# Patient Record
Sex: FEMALE | Race: BLACK OR AFRICAN AMERICAN | Employment: OTHER | URBAN - METROPOLITAN AREA
[De-identification: names, ages, dates, MRNs, and addresses within clinical notes are randomized per-mention and may not be internally consistent; named-entity substitution may affect disease eponyms.]

---

## 2017-02-13 ENCOUNTER — HOSPITAL ENCOUNTER (OUTPATIENT)
Dept: WOMENS IMAGING | Age: 67
Discharge: HOME | End: 2017-02-13
Attending: FAMILY MEDICINE | Admitting: FAMILY MEDICINE

## 2017-02-13 DIAGNOSIS — Z12.31 VISIT FOR SCREENING MAMMOGRAM: ICD-10-CM

## 2018-02-24 ENCOUNTER — HOSPITAL ENCOUNTER (OUTPATIENT)
Dept: WOMENS IMAGING | Age: 68
Discharge: HOME | End: 2018-02-24
Attending: FAMILY MEDICINE | Admitting: FAMILY MEDICINE

## 2018-02-24 DIAGNOSIS — Z12.31 VISIT FOR SCREENING MAMMOGRAM: ICD-10-CM

## 2018-04-09 ENCOUNTER — TELEPHONE (OUTPATIENT)
Dept: BARIATRICS/WEIGHT MGMT | Age: 68
End: 2018-04-09

## 2018-05-02 ENCOUNTER — OFFICE VISIT (OUTPATIENT)
Dept: BARIATRICS/WEIGHT MGMT | Age: 68
End: 2018-05-02

## 2018-05-02 VITALS
SYSTOLIC BLOOD PRESSURE: 128 MMHG | HEIGHT: 63 IN | HEART RATE: 76 BPM | BODY MASS INDEX: 40.96 KG/M2 | WEIGHT: 231.2 LBS | DIASTOLIC BLOOD PRESSURE: 81 MMHG

## 2018-05-02 DIAGNOSIS — E66.01 MORBID OBESITY WITH BMI OF 40.0-44.9, ADULT (HCC): Primary | ICD-10-CM

## 2018-05-02 DIAGNOSIS — E78.5 HYPERLIPIDEMIA, UNSPECIFIED HYPERLIPIDEMIA TYPE: ICD-10-CM

## 2018-05-02 DIAGNOSIS — K21.9 CHRONIC GERD: ICD-10-CM

## 2018-05-02 DIAGNOSIS — E11.9 CONTROLLED TYPE 2 DIABETES MELLITUS WITHOUT COMPLICATION, WITHOUT LONG-TERM CURRENT USE OF INSULIN (HCC): ICD-10-CM

## 2018-05-02 PROCEDURE — 3046F HEMOGLOBIN A1C LEVEL >9.0%: CPT | Performed by: SURGERY

## 2018-05-02 PROCEDURE — G8400 PT W/DXA NO RESULTS DOC: HCPCS | Performed by: SURGERY

## 2018-05-02 PROCEDURE — 3017F COLORECTAL CA SCREEN DOC REV: CPT | Performed by: SURGERY

## 2018-05-02 PROCEDURE — 4040F PNEUMOC VAC/ADMIN/RCVD: CPT | Performed by: SURGERY

## 2018-05-02 PROCEDURE — 2022F DILAT RTA XM EVC RTNOPTHY: CPT | Performed by: SURGERY

## 2018-05-02 PROCEDURE — G8417 CALC BMI ABV UP PARAM F/U: HCPCS | Performed by: SURGERY

## 2018-05-02 PROCEDURE — G8427 DOCREV CUR MEDS BY ELIG CLIN: HCPCS | Performed by: SURGERY

## 2018-05-02 PROCEDURE — 1036F TOBACCO NON-USER: CPT | Performed by: SURGERY

## 2018-05-02 PROCEDURE — 1123F ACP DISCUSS/DSCN MKR DOCD: CPT | Performed by: SURGERY

## 2018-05-02 PROCEDURE — 99204 OFFICE O/P NEW MOD 45 MIN: CPT | Performed by: SURGERY

## 2018-05-02 PROCEDURE — 1090F PRES/ABSN URINE INCON ASSESS: CPT | Performed by: SURGERY

## 2018-05-02 RX ORDER — PANTOPRAZOLE SODIUM 40 MG/1
TABLET, DELAYED RELEASE ORAL NIGHTLY
COMMUNITY
Start: 2018-01-22

## 2018-05-02 RX ORDER — FERROUS SULFATE 325(65) MG
325 TABLET ORAL 2 TIMES DAILY
COMMUNITY

## 2018-06-06 ENCOUNTER — OFFICE VISIT (OUTPATIENT)
Dept: BARIATRICS/WEIGHT MGMT | Age: 68
End: 2018-06-06

## 2018-06-06 VITALS
WEIGHT: 233.6 LBS | HEIGHT: 63 IN | HEART RATE: 85 BPM | BODY MASS INDEX: 41.39 KG/M2 | SYSTOLIC BLOOD PRESSURE: 151 MMHG | DIASTOLIC BLOOD PRESSURE: 92 MMHG

## 2018-06-06 DIAGNOSIS — E11.9 CONTROLLED TYPE 2 DIABETES MELLITUS WITHOUT COMPLICATION, WITHOUT LONG-TERM CURRENT USE OF INSULIN (HCC): ICD-10-CM

## 2018-06-06 DIAGNOSIS — K21.9 CHRONIC GERD: ICD-10-CM

## 2018-06-06 DIAGNOSIS — E78.5 HYPERLIPIDEMIA, UNSPECIFIED HYPERLIPIDEMIA TYPE: ICD-10-CM

## 2018-06-06 DIAGNOSIS — E66.01 MORBID OBESITY WITH BMI OF 40.0-44.9, ADULT (HCC): Primary | ICD-10-CM

## 2018-06-06 PROCEDURE — G8417 CALC BMI ABV UP PARAM F/U: HCPCS | Performed by: SURGERY

## 2018-06-06 PROCEDURE — 1090F PRES/ABSN URINE INCON ASSESS: CPT | Performed by: SURGERY

## 2018-06-06 PROCEDURE — G8427 DOCREV CUR MEDS BY ELIG CLIN: HCPCS | Performed by: SURGERY

## 2018-06-06 PROCEDURE — 2022F DILAT RTA XM EVC RTNOPTHY: CPT | Performed by: SURGERY

## 2018-06-06 PROCEDURE — 3046F HEMOGLOBIN A1C LEVEL >9.0%: CPT | Performed by: SURGERY

## 2018-06-06 PROCEDURE — 1123F ACP DISCUSS/DSCN MKR DOCD: CPT | Performed by: SURGERY

## 2018-06-06 PROCEDURE — 1036F TOBACCO NON-USER: CPT | Performed by: SURGERY

## 2018-06-06 PROCEDURE — 4040F PNEUMOC VAC/ADMIN/RCVD: CPT | Performed by: SURGERY

## 2018-06-06 PROCEDURE — 99213 OFFICE O/P EST LOW 20 MIN: CPT | Performed by: SURGERY

## 2018-06-06 PROCEDURE — 3017F COLORECTAL CA SCREEN DOC REV: CPT | Performed by: SURGERY

## 2018-06-06 PROCEDURE — G8400 PT W/DXA NO RESULTS DOC: HCPCS | Performed by: SURGERY

## 2018-06-09 ENCOUNTER — HOSPITAL ENCOUNTER (OUTPATIENT)
Dept: OTHER | Age: 68
Discharge: HOME | End: 2018-06-09
Attending: SURGERY | Admitting: SURGERY

## 2018-06-09 DIAGNOSIS — E66.01 MORBID OBESITY WITH BMI OF 40.0-44.9, ADULT (HCC): ICD-10-CM

## 2018-06-09 DIAGNOSIS — E78.5 HYPERLIPIDEMIA, UNSPECIFIED HYPERLIPIDEMIA TYPE: ICD-10-CM

## 2018-06-09 LAB
A/G RATIO: 1.2 (ref 1.1–2.2)
ALBUMIN SERPL-MCNC: 3.9 G/DL (ref 3.4–5)
ALP BLD-CCNC: 64 U/L (ref 40–129)
ALT SERPL-CCNC: 13 U/L (ref 10–40)
ANION GAP SERPL CALCULATED.3IONS-SCNC: 15 MMOL/L (ref 3–16)
AST SERPL-CCNC: 18 U/L (ref 15–37)
BASOPHILS ABSOLUTE: 0 K/UL (ref 0–0.2)
BASOPHILS RELATIVE PERCENT: 0.9 %
BILIRUB SERPL-MCNC: 0.6 MG/DL (ref 0–1)
BUN BLDV-MCNC: 10 MG/DL (ref 7–20)
CALCIUM SERPL-MCNC: 9.3 MG/DL (ref 8.3–10.6)
CHLORIDE BLD-SCNC: 100 MMOL/L (ref 99–110)
CHOLESTEROL, TOTAL: 146 MG/DL (ref 0–199)
CO2: 26 MMOL/L (ref 21–32)
CREAT SERPL-MCNC: 0.7 MG/DL (ref 0.6–1.2)
EOSINOPHILS ABSOLUTE: 0.3 K/UL (ref 0–0.6)
EOSINOPHILS RELATIVE PERCENT: 5.7 %
FOLATE: 15.59 NG/ML (ref 4.78–24.2)
GFR AFRICAN AMERICAN: >60
GFR NON-AFRICAN AMERICAN: >60
GLOBULIN: 3.2 G/DL
GLUCOSE BLD-MCNC: 95 MG/DL (ref 70–99)
HCT VFR BLD CALC: 44.4 % (ref 36–48)
HDLC SERPL-MCNC: 59 MG/DL (ref 40–60)
HEMOGLOBIN: 15 G/DL (ref 12–16)
IRON SATURATION: 25 % (ref 15–50)
IRON: 85 UG/DL (ref 37–145)
LDL CHOLESTEROL CALCULATED: 72 MG/DL
LYMPHOCYTES ABSOLUTE: 1.3 K/UL (ref 1–5.1)
LYMPHOCYTES RELATIVE PERCENT: 29.2 %
MCH RBC QN AUTO: 31.8 PG (ref 26–34)
MCHC RBC AUTO-ENTMCNC: 33.7 G/DL (ref 31–36)
MCV RBC AUTO: 94.3 FL (ref 80–100)
MONOCYTES ABSOLUTE: 0.4 K/UL (ref 0–1.3)
MONOCYTES RELATIVE PERCENT: 8 %
NEUTROPHILS ABSOLUTE: 2.6 K/UL (ref 1.7–7.7)
NEUTROPHILS RELATIVE PERCENT: 56.2 %
PDW BLD-RTO: 13.7 % (ref 12.4–15.4)
PLATELET # BLD: 269 K/UL (ref 135–450)
PMV BLD AUTO: 9.9 FL (ref 5–10.5)
POTASSIUM SERPL-SCNC: 3.7 MMOL/L (ref 3.5–5.1)
RBC # BLD: 4.71 M/UL (ref 4–5.2)
SODIUM BLD-SCNC: 141 MMOL/L (ref 136–145)
TOTAL IRON BINDING CAPACITY: 338 UG/DL (ref 260–445)
TOTAL PROTEIN: 7.1 G/DL (ref 6.4–8.2)
TRIGL SERPL-MCNC: 76 MG/DL (ref 0–150)
TSH REFLEX: 0.58 UIU/ML (ref 0.27–4.2)
VITAMIN B-12: 1284 PG/ML (ref 211–911)
VITAMIN D 25-HYDROXY: 22 NG/ML
VLDLC SERPL CALC-MCNC: 15 MG/DL
WBC # BLD: 4.6 K/UL (ref 4–11)

## 2018-06-10 LAB
ESTIMATED AVERAGE GLUCOSE: 111.2 MG/DL
HBA1C MFR BLD: 5.5 %

## 2018-06-11 ENCOUNTER — TELEPHONE (OUTPATIENT)
Dept: BARIATRICS/WEIGHT MGMT | Age: 68
End: 2018-06-11

## 2018-06-11 DIAGNOSIS — R06.02 SHORTNESS OF BREATH: ICD-10-CM

## 2018-06-11 DIAGNOSIS — Z01.818 PRE-OPERATIVE CLEARANCE: Primary | ICD-10-CM

## 2018-06-11 DIAGNOSIS — K21.9 CHRONIC GERD: ICD-10-CM

## 2018-07-02 ENCOUNTER — TELEPHONE (OUTPATIENT)
Dept: BARIATRICS/WEIGHT MGMT | Age: 68
End: 2018-07-02

## 2018-07-02 DIAGNOSIS — E66.01 MORBID OBESITY WITH BMI OF 40.0-44.9, ADULT (HCC): Primary | ICD-10-CM

## 2018-07-02 NOTE — TELEPHONE ENCOUNTER
PFT has been ordered, attempted to call patient but her mobile number does not have a vm to be able to leave a message. Will try patient again in the afternoon.

## 2018-07-02 NOTE — TELEPHONE ENCOUNTER
Please place it    For future reference, You can always place labs,imaging orders for me   No need to delay for my response     Just send me a note saying you did it    Thanks    [address] [facility]

## 2018-07-18 ENCOUNTER — OFFICE VISIT (OUTPATIENT)
Dept: BARIATRICS/WEIGHT MGMT | Age: 68
End: 2018-07-18

## 2018-07-18 VITALS
HEIGHT: 63 IN | WEIGHT: 229.8 LBS | SYSTOLIC BLOOD PRESSURE: 122 MMHG | BODY MASS INDEX: 40.72 KG/M2 | DIASTOLIC BLOOD PRESSURE: 89 MMHG | HEART RATE: 86 BPM

## 2018-07-18 DIAGNOSIS — E11.9 CONTROLLED TYPE 2 DIABETES MELLITUS WITHOUT COMPLICATION, WITHOUT LONG-TERM CURRENT USE OF INSULIN (HCC): ICD-10-CM

## 2018-07-18 DIAGNOSIS — E66.01 MORBID OBESITY WITH BMI OF 40.0-44.9, ADULT (HCC): Primary | ICD-10-CM

## 2018-07-18 DIAGNOSIS — K21.9 CHRONIC GERD: ICD-10-CM

## 2018-07-18 DIAGNOSIS — E78.5 HYPERLIPIDEMIA, UNSPECIFIED HYPERLIPIDEMIA TYPE: ICD-10-CM

## 2018-07-18 PROCEDURE — G8417 CALC BMI ABV UP PARAM F/U: HCPCS | Performed by: SURGERY

## 2018-07-18 PROCEDURE — 1036F TOBACCO NON-USER: CPT | Performed by: SURGERY

## 2018-07-18 PROCEDURE — G8427 DOCREV CUR MEDS BY ELIG CLIN: HCPCS | Performed by: SURGERY

## 2018-07-18 PROCEDURE — 3044F HG A1C LEVEL LT 7.0%: CPT | Performed by: SURGERY

## 2018-07-18 PROCEDURE — 3017F COLORECTAL CA SCREEN DOC REV: CPT | Performed by: SURGERY

## 2018-07-18 PROCEDURE — G8400 PT W/DXA NO RESULTS DOC: HCPCS | Performed by: SURGERY

## 2018-07-18 PROCEDURE — 99213 OFFICE O/P EST LOW 20 MIN: CPT | Performed by: SURGERY

## 2018-07-18 PROCEDURE — 1101F PT FALLS ASSESS-DOCD LE1/YR: CPT | Performed by: SURGERY

## 2018-07-18 PROCEDURE — 1123F ACP DISCUSS/DSCN MKR DOCD: CPT | Performed by: SURGERY

## 2018-07-18 PROCEDURE — 4040F PNEUMOC VAC/ADMIN/RCVD: CPT | Performed by: SURGERY

## 2018-07-18 PROCEDURE — 1090F PRES/ABSN URINE INCON ASSESS: CPT | Performed by: SURGERY

## 2018-07-18 PROCEDURE — 2022F DILAT RTA XM EVC RTNOPTHY: CPT | Performed by: SURGERY

## 2018-07-19 ENCOUNTER — TELEPHONE (OUTPATIENT)
Dept: BARIATRICS/WEIGHT MGMT | Age: 68
End: 2018-07-19

## 2018-07-19 NOTE — PROGRESS NOTES
[facility]) Physicians   General & Laparoscopic Surgery  [employer]       HPI:     Netta Landa is a very pleasant 76 y.o. female with Body mass index is 40.71 kg/m². , Pre-Surgery. Pre-operative clearance and work up pending. Working hard to keep good dietary habits as well level of activity. Patient denies any nausea, vomiting, fevers, chills, shortness of breath, chest pain, cough, constipation or difficulty urinating. Past Medical History:   Diagnosis Date    Arthritis     Back pain     Chronic GERD     Congenital absence of one kidney     Diabetes mellitus ([address]     Glaucoma     Hyperlipidemia, unspecified     Hypertension      Past Surgical History:   Procedure Laterality Date    CHOLECYSTECTOMY      EYE SURGERY      cataract removal    HYSTERECTOMY      KNEE SURGERY       Family History   Problem Relation Age of Onset    Cancer Mother     High Blood Pressure Mother     High Cholesterol Mother     Arthritis Mother     Glaucoma Mother     Cancer Father     High Blood Pressure Father     High Cholesterol Father     Diabetes Father     Arthritis Father     High Blood Pressure Brother     Migraines Brother     Glaucoma Maternal Grandmother      Social History   Substance Use Topics    Smoking status: Former Smoker     Quit date: 3/12/2003    Smokeless tobacco: Never Used    Alcohol use No     I counseled the patient on the importance of not smoking and risks of ETOH. Allergies   Allergen Reactions    Codeine     Ibuprofen      GI issues    Naprosyn [Naproxen]      Vitals:    07/18/18 1651   BP: 122/89   Pulse: 86   Weight: 229 lb 12.8 oz (104.2 kg)   Height: 5' 3\" (1.6 m)       Body mass index is 40.71 kg/m².       Current Outpatient Prescriptions:     pantoprazole (PROTONIX) 40 MG tablet, TAKE ONE TABLET BY MOUTH TWICE DAILY BEFORE MEAL(S) (1/2 HOUR BEFORE BREAKFAST AND DINNER), Disp: , Rfl:     ferrous sulfate 325 (65 Fe) MG tablet, Take 325 mg dietary counseling with registered dietician. I have reviewed, discussed and agree with the dietary plan. Patient is trying hard to keep good dietary and behavior modifications. Patient is monitoring portion sizes, food choices and liquid calories. Patient is trying to exercise regularly as much as possible. We discussed how her weight affects her overall health including:  Patient Active Problem List   Diagnosis    Chest pain    SELF (dyspnea on exertion)    Acute bronchitis    URI (upper respiratory infection)    DM II (diabetes mellitus, type II), controlled ([facility] [address]    Osteoarthritis    Glaucoma    Unilateral congenital absence of kidney    Hyperlipidemia, unspecified    Chronic GERD    and importance of weight loss to alleviate those co morbid conditions. I encouraged the patient to continue exercise and keeping healthy eating habits. Discussed pre-op labs and work up till now. Also counseled the patient extensively on Surgery. 15 minutes spent counseling the patient, answering questions and addressing concerns as well reviewing labs and/or other studies as well coordinating care. More than 50% was face to face time counseling the patient. RTC in 4 weeks  Obtain rest of pre-op work up / clearances  Diet and Exercise      Patient advised that its their responsibility to follow up for studies and/or labs ordered today.      Fernando Schneider DO

## 2019-03-09 ENCOUNTER — HOSPITAL ENCOUNTER (OUTPATIENT)
Dept: WOMENS IMAGING | Age: 69
Discharge: HOME | End: 2019-03-09
Payer: MEDICARE

## 2019-03-09 DIAGNOSIS — Z12.39 BREAST CANCER SCREENING: ICD-10-CM

## 2019-03-09 PROCEDURE — 77067 SCR MAMMO BI INCL CAD: CPT

## 2020-05-30 ENCOUNTER — HOSPITAL ENCOUNTER (OUTPATIENT)
Dept: WOMENS IMAGING | Age: 70
Discharge: HOME | End: 2020-05-30
Payer: MEDICARE

## 2020-05-30 PROCEDURE — 77067 SCR MAMMO BI INCL CAD: CPT

## 2021-03-18 ENCOUNTER — HOSPITAL ENCOUNTER (OUTPATIENT)
Dept: MRI IMAGING | Age: 71
Discharge: HOME | End: 2021-03-18
Payer: MEDICARE

## 2021-03-18 ENCOUNTER — OFFICE VISIT (OUTPATIENT)
Dept: PRIMARY CARE CLINIC | Age: 71
End: 2021-03-18
Payer: MEDICARE

## 2021-03-18 ENCOUNTER — HOSPITAL ENCOUNTER (OUTPATIENT)
Dept: CT IMAGING | Age: 71
Discharge: HOME | End: 2021-03-18
Payer: MEDICARE

## 2021-03-18 DIAGNOSIS — D43.4 NEOPLASM OF UNCERTAIN BEHAVIOR OF BRAIN AND SPINAL CORD (HCC): ICD-10-CM

## 2021-03-18 DIAGNOSIS — Z01.818 PREOP EXAMINATION: Primary | ICD-10-CM

## 2021-03-18 DIAGNOSIS — D43.2 NEOPLASM OF UNCERTAIN BEHAVIOR OF BRAIN AND SPINAL CORD (HCC): ICD-10-CM

## 2021-03-18 LAB
GFR AFRICAN AMERICAN: >60
GFR NON-AFRICAN AMERICAN: >60
PERFORMED ON: NORMAL
POC CREATININE: 0.7 MG/DL (ref 0.6–1.2)
POC SAMPLE TYPE: NORMAL

## 2021-03-18 PROCEDURE — 99211 OFF/OP EST MAY X REQ PHY/QHP: CPT | Performed by: NURSE PRACTITIONER

## 2021-03-18 PROCEDURE — 70552 MRI BRAIN STEM W/DYE: CPT

## 2021-03-18 PROCEDURE — G8421 BMI NOT CALCULATED: HCPCS | Performed by: NURSE PRACTITIONER

## 2021-03-18 PROCEDURE — G8428 CUR MEDS NOT DOCUMENT: HCPCS | Performed by: NURSE PRACTITIONER

## 2021-03-18 PROCEDURE — 6360000004 HC RX CONTRAST MEDICATION: Performed by: NEUROLOGICAL SURGERY

## 2021-03-18 PROCEDURE — 82565 ASSAY OF CREATININE: CPT

## 2021-03-18 PROCEDURE — 74177 CT ABD & PELVIS W/CONTRAST: CPT

## 2021-03-18 PROCEDURE — A9579 GAD-BASE MR CONTRAST NOS,1ML: HCPCS | Performed by: NEUROLOGICAL SURGERY

## 2021-03-18 RX ORDER — LEVETIRACETAM 500 MG/1
500 TABLET ORAL 2 TIMES DAILY
COMMUNITY

## 2021-03-18 RX ADMIN — GADOTERIDOL 20 ML: 279.3 INJECTION, SOLUTION INTRAVENOUS at 10:42

## 2021-03-18 RX ADMIN — IOPAMIDOL 80 ML: 755 INJECTION, SOLUTION INTRAVENOUS at 11:43

## 2021-03-18 NOTE — PROGRESS NOTES
[address] [facility] patients having surgery or anesthesia are required to be Covid tested. You will need to quarantine from the time you are tested until your surgery. PRIOR TO PROCEDURE DATE:        1. PLEASE FOLLOW ANY  GUIDELINES/ INSTRUCTIONS PRIOR TO YOUR PROCEDURE AS ADVISED BY YOUR SURGEON. 2. Arrange for someone to drive you home and be with you for the first 24 hours after discharge for your safety after your procedure for which you received sedation. Ensure it is someone we can share information with regarding your discharge. 3. You must contact your surgeon for instructions IF:   You are taking any blood thinners, aspirin, anti-inflammatory or vitamin E.   There is a change in your physical condition such as a cold, fever, rash, cuts, sores or any other infection, especially near your surgical site. 4. Do not drink alcohol the day before or day of your procedure. 5. A Pre-op History and Physical for surgery MUST be completed by your Physician or Urgent Care within 30 days of your procedure date. Please bring a copy with you on the day of your procedure and along with any other testing performed. THE DAY OF YOUR PROCEDURE:  1. Follow instructions for ARRIVAL TIME as DIRECTED BY YOUR SURGEON. I    1. Enter the MAIN entrance from [employer] and follow the signs to the free [employer] or [employer] parking (offered free of charge 6am-5pm). 2. Enter the Main Entrance of the hospital (do not enter from the lower level of the parking garage). Upon entrance, check in with the  at the main desk on your left. If no one is available at the desk, proceed into the [facility] Waiting Room and go through the door directly into the [facility]. There is a Check-in desk ACROSS from Room 5 (marked with a sign hanging from the ceiling).  The phone number for the surgery center is [phone]. 4. Please call [phone] option #2 option #2 if you have not been preregistered yet. On the day of your procedure bring your insurance card and photo ID. You will be registered at your bedside once brought back to your room. 5. DO NOT EAT ANYTHING eight hours prior to your arrival for surgery. May have 8 ounces of water 4 hours prior to your arrival for surgery. NOTE: ALL Gastric, Bariatric and Bowel surgery patients MUST follow their surgeon's instructions. 6. MEDICATIONS    Take the following medications with a SMALL sip of water: KEPPRA   Use your usual dose of inhalers the morning of surgery. BRING your rescue inhaler with you to hospital.    Anesthesia does NOT want you to take insulin the morning of surgery. They will control your blood sugar while you are at the hospital. Please contact your ordering physician for instructions regarding your insulin the night before your procedure. If you have an insulin pump, please keep it set on basal rate. 7. Do not swallow water when brushing teeth. No gum, candy, mints or ice chips. Refrain from smoking or at least decrease the amount. 8. Dress in loose, comfortable clothing appropriate for redressing after your procedure. Do not wear jewelry (including body piercings), make-up (especially NO eye make-up), fingernail polish (NO toenail polish if foot/leg surgery), lotion, powders or metal hairclips. 9. Dentures, glasses, or contacts will need to be removed before your procedure. Bring cases for your glasses, contacts, dentures, or hearing aids to protect them while you are in surgery. 10. If you use a CPAP, please bring it with you on the day of your procedure. 11. We recommend that valuable personal  belongings such as cash, cell phones, e-tablets or jewelry, be left at home during your stay. The hospital will not be responsible for valuables that are not secured in the hospital safe.  However, if your insurance PACU for the first phase of your recovery. Your nurse will help you recover from any potential side effects of anesthesia, such as extreme drowsiness, changes in your vital signs or breathing patterns. Nausea, headache, muscle aches, or sore throat may also occur after anesthesia. Your nurse will help you manage these potential side effects. 2. For comfort and safety, arrange to have someone at home with you for the first 24 hours after discharge. 3. You and your family will be given written instructions about your diet, activity, dressing care, medications, and return visits. 4. Once at home, should issues with nausea, pain, or bleeding occur, or should you notice any signs of infection, you should call your surgeon. 5. Always clean your hands before and after caring for your wound. Do not let your family touch your surgery site without cleaning their hands. 6. Narcotic pain medications can cause significant constipation. You may want to add a stool softener to your postoperative medication schedule or speak to your surgeon on how best to manage this SIDE EFFECT. SPECIAL INSTRUCTIONS     Thank you for allowing us to care for you. We strive to exceed your expectations in the delivery of care and service provided to you and your family. If you need to contact the [place] [address] staff for any reason, please call us at [phone]    Instructions reviewed with patient during preadmission testing phone interview. Calli Chavez. 3/18/2021 .3:37 PM      ADDITIONAL EDUCATIONAL INFORMATION REVIEWED PER PHONE WITH YOU AND/OR YOUR FAMILY:  No Bring a urine sample on day of surgery  Yes Hibiclens® Bathing Instructions   Yes Antibacterial Soap

## 2021-03-19 LAB — SARS-COV-2: NOT DETECTED

## 2021-03-19 NOTE — PROGRESS NOTES
the order may or may not be in effect during this procedure. I give my doctor permission to give me blood or blood products. I understand that there are risks with receiving blood such as hepatitis, AIDS, fever, or allergic reaction. I acknowledge that the risks, benefits, and alternatives of this treatment have been explained to me and that no express or implied warranty has been given by the hospital, any blood bank, or any person or entity as to the blood or blood components transfused. At the discretion of my doctor, I agree to allow observers, equipment/product representatives and allow photographing, and/or televising of the procedure, provided my name or identity is maintained confidentially. I agree the hospital may dispose of or use for scientific or educational purposes any tissue, fluid, or body parts which may be removed.     ________________________________Date________Time______ am/pm  ([place] [facility])  Patient or Signature of Closest Relative or Legal Guardian    ________________________________Date________Time______am/pm      Page 1 of  1  Witness

## 2021-03-23 ENCOUNTER — ANESTHESIA EVENT (OUTPATIENT)
Dept: OPERATING ROOM | Age: 71
DRG: 025 | End: 2021-03-23
Payer: MEDICARE

## 2021-03-24 ENCOUNTER — HOSPITAL ENCOUNTER (INPATIENT)
Age: 71
LOS: 2 days | Discharge: HOME | DRG: 025 | End: 2021-03-26
Attending: NEUROLOGICAL SURGERY | Admitting: NEUROLOGICAL SURGERY
Payer: MEDICARE

## 2021-03-24 ENCOUNTER — ANESTHESIA (OUTPATIENT)
Dept: OPERATING ROOM | Age: 71
DRG: 025 | End: 2021-03-24
Payer: MEDICARE

## 2021-03-24 ENCOUNTER — APPOINTMENT (OUTPATIENT)
Dept: CT IMAGING | Age: 71
DRG: 025 | End: 2021-03-24
Attending: NEUROLOGICAL SURGERY
Payer: MEDICARE

## 2021-03-24 VITALS
SYSTOLIC BLOOD PRESSURE: 107 MMHG | RESPIRATION RATE: 13 BRPM | DIASTOLIC BLOOD PRESSURE: 56 MMHG | OXYGEN SATURATION: 97 % | TEMPERATURE: 80.2 F

## 2021-03-24 DIAGNOSIS — D43.2 NEOPLASM OF UNCERTAIN BEHAVIOR OF BRAIN (HCC): ICD-10-CM

## 2021-03-24 DIAGNOSIS — Z98.890 S/P CRANIOTOMY: Primary | ICD-10-CM

## 2021-03-24 PROBLEM — G93.89 BRAIN MASS: Status: ACTIVE | Noted: 2021-03-24

## 2021-03-24 LAB
ABO/RH: NORMAL
ANION GAP SERPL CALCULATED.3IONS-SCNC: 13 MMOL/L (ref 3–16)
ANTIBODY SCREEN: NORMAL
APTT: 25.7 SEC (ref 24.2–36.2)
BASE EXCESS ARTERIAL: -2 (ref -3–3)
BUN BLDV-MCNC: 6 MG/DL (ref 7–20)
CALCIUM IONIZED: 1.16 MMOL/L (ref 1.12–1.32)
CALCIUM IONIZED: 1.19 MMOL/L (ref 1.12–1.32)
CALCIUM SERPL-MCNC: 8.6 MG/DL (ref 8.3–10.6)
CHLORIDE BLD-SCNC: 105 MMOL/L (ref 99–110)
CO2: 22 MMOL/L (ref 21–32)
CREAT SERPL-MCNC: 0.5 MG/DL (ref 0.6–1.2)
EKG ATRIAL RATE: 76 BPM
EKG DIAGNOSIS: NORMAL
EKG P AXIS: 36 DEGREES
EKG P-R INTERVAL: 170 MS
EKG Q-T INTERVAL: 416 MS
EKG QRS DURATION: 84 MS
EKG QTC CALCULATION (BAZETT): 468 MS
EKG R AXIS: -52 DEGREES
EKG T AXIS: 13 DEGREES
EKG VENTRICULAR RATE: 76 BPM
GFR AFRICAN AMERICAN: >60
GFR NON-AFRICAN AMERICAN: >60
GLUCOSE BLD-MCNC: 113 MG/DL (ref 70–99)
GLUCOSE BLD-MCNC: 121 MG/DL (ref 70–99)
GLUCOSE BLD-MCNC: 123 MG/DL (ref 70–99)
GLUCOSE BLD-MCNC: 124 MG/DL (ref 70–99)
GLUCOSE BLD-MCNC: 124 MG/DL (ref 70–99)
GLUCOSE BLD-MCNC: 97 MG/DL (ref 70–99)
HCO3 ARTERIAL: 22.4 MMOL/L (ref 21–29)
INR BLD: 1.31 (ref 0.86–1.14)
LACTATE: 1.51 MMOL/L (ref 0.4–2)
MAGNESIUM: 1.3 MG/DL (ref 1.8–2.4)
O2 SAT, ARTERIAL: 96 % (ref 93–100)
PCO2 ARTERIAL: 32.6 MM HG (ref 35–45)
PCO2 ARTERIAL: 33.5 MM HG (ref 35–45)
PERFORMED ON: ABNORMAL
PERFORMED ON: NORMAL
PH ARTERIAL: 7.45 (ref 7.35–7.45)
PO2 ARTERIAL: 66.6 MM HG (ref 75–108)
PO2 ARTERIAL: 76.7 MM HG (ref 75–108)
POC POTASSIUM: 2.7 MMOL/L (ref 3.5–5.1)
POC POTASSIUM: 2.8 MMOL/L (ref 3.5–5.1)
POC SAMPLE TYPE: ABNORMAL
POC SAMPLE TYPE: ABNORMAL
POC SODIUM: 141 MMOL/L (ref 136–145)
POC SODIUM: 142 MMOL/L (ref 136–145)
POTASSIUM SERPL-SCNC: 3 MMOL/L (ref 3.5–5.1)
PROTHROMBIN TIME: 15.2 SEC (ref 10–13.2)
SODIUM BLD-SCNC: 140 MMOL/L (ref 136–145)
TCO2 ARTERIAL: 23 MMOL/L

## 2021-03-24 PROCEDURE — 6360000002 HC RX W HCPCS: Performed by: NEUROLOGICAL SURGERY

## 2021-03-24 PROCEDURE — 83605 ASSAY OF LACTIC ACID: CPT

## 2021-03-24 PROCEDURE — 82803 BLOOD GASES ANY COMBINATION: CPT

## 2021-03-24 PROCEDURE — 2580000003 HC RX 258: Performed by: ANESTHESIOLOGY

## 2021-03-24 PROCEDURE — 82330 ASSAY OF CALCIUM: CPT

## 2021-03-24 PROCEDURE — 93010 ELECTROCARDIOGRAM REPORT: CPT | Performed by: INTERNAL MEDICINE

## 2021-03-24 PROCEDURE — 36415 COLL VENOUS BLD VENIPUNCTURE: CPT

## 2021-03-24 PROCEDURE — 2580000003 HC RX 258: Performed by: NURSE ANESTHETIST, CERTIFIED REGISTERED

## 2021-03-24 PROCEDURE — 6370000000 HC RX 637 (ALT 250 FOR IP): Performed by: NEUROLOGICAL SURGERY

## 2021-03-24 PROCEDURE — 70450 CT HEAD/BRAIN W/O DYE: CPT

## 2021-03-24 PROCEDURE — 86900 BLOOD TYPING SEROLOGIC ABO: CPT

## 2021-03-24 PROCEDURE — 3600000014 HC SURGERY LEVEL 4 ADDTL 15MIN: Performed by: NEUROLOGICAL SURGERY

## 2021-03-24 PROCEDURE — 3600000004 HC SURGERY LEVEL 4 BASE: Performed by: NEUROLOGICAL SURGERY

## 2021-03-24 PROCEDURE — 3700000000 HC ANESTHESIA ATTENDED CARE: Performed by: NEUROLOGICAL SURGERY

## 2021-03-24 PROCEDURE — 83735 ASSAY OF MAGNESIUM: CPT

## 2021-03-24 PROCEDURE — 2580000003 HC RX 258: Performed by: NEUROLOGICAL SURGERY

## 2021-03-24 PROCEDURE — 6360000002 HC RX W HCPCS: Performed by: NURSE ANESTHETIST, CERTIFIED REGISTERED

## 2021-03-24 PROCEDURE — 93005 ELECTROCARDIOGRAM TRACING: CPT | Performed by: NEUROLOGICAL SURGERY

## 2021-03-24 PROCEDURE — 7100000001 HC PACU RECOVERY - ADDTL 15 MIN: Performed by: NEUROLOGICAL SURGERY

## 2021-03-24 PROCEDURE — 84295 ASSAY OF SERUM SODIUM: CPT

## 2021-03-24 PROCEDURE — 80048 BASIC METABOLIC PNL TOTAL CA: CPT

## 2021-03-24 PROCEDURE — 88342 IMHCHEM/IMCYTCHM 1ST ANTB: CPT

## 2021-03-24 PROCEDURE — 2000000000 HC ICU R&B

## 2021-03-24 PROCEDURE — 2720000010 HC SURG SUPPLY STERILE: Performed by: NEUROLOGICAL SURGERY

## 2021-03-24 PROCEDURE — 6360000002 HC RX W HCPCS

## 2021-03-24 PROCEDURE — 86850 RBC ANTIBODY SCREEN: CPT

## 2021-03-24 PROCEDURE — C1713 ANCHOR/SCREW BN/BN,TIS/BN: HCPCS | Performed by: NEUROLOGICAL SURGERY

## 2021-03-24 PROCEDURE — 7100000000 HC PACU RECOVERY - FIRST 15 MIN: Performed by: NEUROLOGICAL SURGERY

## 2021-03-24 PROCEDURE — 2500000003 HC RX 250 WO HCPCS: Performed by: NEUROLOGICAL SURGERY

## 2021-03-24 PROCEDURE — 2500000003 HC RX 250 WO HCPCS: Performed by: NURSE ANESTHETIST, CERTIFIED REGISTERED

## 2021-03-24 PROCEDURE — 85610 PROTHROMBIN TIME: CPT

## 2021-03-24 PROCEDURE — 85730 THROMBOPLASTIN TIME PARTIAL: CPT

## 2021-03-24 PROCEDURE — 84132 ASSAY OF SERUM POTASSIUM: CPT

## 2021-03-24 PROCEDURE — 88307 TISSUE EXAM BY PATHOLOGIST: CPT

## 2021-03-24 PROCEDURE — 82947 ASSAY GLUCOSE BLOOD QUANT: CPT

## 2021-03-24 PROCEDURE — 2709999900 HC NON-CHARGEABLE SUPPLY: Performed by: NEUROLOGICAL SURGERY

## 2021-03-24 PROCEDURE — 86901 BLOOD TYPING SEROLOGIC RH(D): CPT

## 2021-03-24 PROCEDURE — 88331 PATH CONSLTJ SURG 1 BLK 1SPC: CPT

## 2021-03-24 PROCEDURE — 3700000001 HC ADD 15 MINUTES (ANESTHESIA): Performed by: NEUROLOGICAL SURGERY

## 2021-03-24 PROCEDURE — 00B70ZZ EXCISION OF CEREBRAL HEMISPHERE, OPEN APPROACH: ICD-10-PCS | Performed by: NEUROLOGICAL SURGERY

## 2021-03-24 PROCEDURE — 88341 IMHCHEM/IMCYTCHM EA ADD ANTB: CPT

## 2021-03-24 DEVICE — COVER BUR H DIA17MM 6 H CRANIOMAXILLOFACIAL BLU TI: Type: IMPLANTABLE DEVICE | Site: CRANIAL | Status: FUNCTIONAL

## 2021-03-24 DEVICE — SCREW BNE L4MM DIA1.5MM CRANIOFACIAL TI SELF DRL: Type: IMPLANTABLE DEVICE | Site: CRANIAL | Status: FUNCTIONAL

## 2021-03-24 RX ORDER — FENTANYL CITRATE 50 UG/ML
INJECTION, SOLUTION INTRAMUSCULAR; INTRAVENOUS PRN
Status: DISCONTINUED | OUTPATIENT
Start: 2021-03-24 | End: 2021-03-24 | Stop reason: SDUPTHER

## 2021-03-24 RX ORDER — SODIUM CHLORIDE 0.9 % (FLUSH) 0.9 %
10 SYRINGE (ML) INJECTION PRN
Status: DISCONTINUED | OUTPATIENT
Start: 2021-03-24 | End: 2021-03-26 | Stop reason: HOSPADM

## 2021-03-24 RX ORDER — MANNITOL 250 MG/ML
INJECTION, SOLUTION INTRAVENOUS PRN
Status: DISCONTINUED | OUTPATIENT
Start: 2021-03-24 | End: 2021-03-24 | Stop reason: SDUPTHER

## 2021-03-24 RX ORDER — POTASSIUM CHLORIDE 7.45 MG/ML
INJECTION INTRAVENOUS PRN
Status: DISCONTINUED | OUTPATIENT
Start: 2021-03-24 | End: 2021-03-24 | Stop reason: SDUPTHER

## 2021-03-24 RX ORDER — DEXAMETHASONE SODIUM PHOSPHATE 4 MG/ML
INJECTION, SOLUTION INTRA-ARTICULAR; INTRALESIONAL; INTRAMUSCULAR; INTRAVENOUS; SOFT TISSUE PRN
Status: DISCONTINUED | OUTPATIENT
Start: 2021-03-24 | End: 2021-03-24 | Stop reason: SDUPTHER

## 2021-03-24 RX ORDER — ONDANSETRON 2 MG/ML
INJECTION INTRAMUSCULAR; INTRAVENOUS PRN
Status: DISCONTINUED | OUTPATIENT
Start: 2021-03-24 | End: 2021-03-24 | Stop reason: SDUPTHER

## 2021-03-24 RX ORDER — LIDOCAINE HYDROCHLORIDE 20 MG/ML
INJECTION, SOLUTION INFILTRATION; PERINEURAL PRN
Status: DISCONTINUED | OUTPATIENT
Start: 2021-03-24 | End: 2021-03-24 | Stop reason: SDUPTHER

## 2021-03-24 RX ORDER — DEXAMETHASONE 4 MG/1
4 TABLET ORAL EVERY 6 HOURS SCHEDULED
Status: COMPLETED | OUTPATIENT
Start: 2021-03-24 | End: 2021-03-25

## 2021-03-24 RX ORDER — FERROUS SULFATE 325(65) MG
325 TABLET ORAL 2 TIMES DAILY
Status: DISCONTINUED | OUTPATIENT
Start: 2021-03-24 | End: 2021-03-26 | Stop reason: HOSPADM

## 2021-03-24 RX ORDER — LEVETIRACETAM 500 MG/1
500 TABLET ORAL 2 TIMES DAILY
Status: DISCONTINUED | OUTPATIENT
Start: 2021-03-24 | End: 2021-03-26 | Stop reason: HOSPADM

## 2021-03-24 RX ORDER — PANTOPRAZOLE SODIUM 40 MG/1
40 TABLET, DELAYED RELEASE ORAL
Status: DISCONTINUED | OUTPATIENT
Start: 2021-03-25 | End: 2021-03-26 | Stop reason: HOSPADM

## 2021-03-24 RX ORDER — SODIUM CHLORIDE 0.9 % (FLUSH) 0.9 %
10 SYRINGE (ML) INJECTION EVERY 12 HOURS SCHEDULED
Status: DISCONTINUED | OUTPATIENT
Start: 2021-03-24 | End: 2021-03-26 | Stop reason: HOSPADM

## 2021-03-24 RX ORDER — HYDRALAZINE HYDROCHLORIDE 20 MG/ML
5 INJECTION INTRAMUSCULAR; INTRAVENOUS EVERY 10 MIN PRN
Status: DISCONTINUED | OUTPATIENT
Start: 2021-03-24 | End: 2021-03-24 | Stop reason: HOSPADM

## 2021-03-24 RX ORDER — SODIUM CHLORIDE 9 MG/ML
INJECTION, SOLUTION INTRAVENOUS CONTINUOUS
Status: DISCONTINUED | OUTPATIENT
Start: 2021-03-24 | End: 2021-03-24

## 2021-03-24 RX ORDER — NICOTINE POLACRILEX 4 MG
15 LOZENGE BUCCAL PRN
Status: DISCONTINUED | OUTPATIENT
Start: 2021-03-24 | End: 2021-03-26 | Stop reason: HOSPADM

## 2021-03-24 RX ORDER — DEXAMETHASONE 4 MG/1
4 TABLET ORAL EVERY 12 HOURS SCHEDULED
Status: DISCONTINUED | OUTPATIENT
Start: 2021-03-26 | End: 2021-03-26 | Stop reason: HOSPADM

## 2021-03-24 RX ORDER — PROPOFOL 10 MG/ML
INJECTION, EMULSION INTRAVENOUS PRN
Status: DISCONTINUED | OUTPATIENT
Start: 2021-03-24 | End: 2021-03-24 | Stop reason: SDUPTHER

## 2021-03-24 RX ORDER — MAGNESIUM SULFATE 1 G/100ML
1000 INJECTION INTRAVENOUS PRN
Status: DISCONTINUED | OUTPATIENT
Start: 2021-03-24 | End: 2021-03-26 | Stop reason: HOSPADM

## 2021-03-24 RX ORDER — 0.9 % SODIUM CHLORIDE 0.9 %
500 INTRAVENOUS SOLUTION INTRAVENOUS
Status: DISCONTINUED | OUTPATIENT
Start: 2021-03-24 | End: 2021-03-24 | Stop reason: HOSPADM

## 2021-03-24 RX ORDER — SUCCINYLCHOLINE CHLORIDE 20 MG/ML
INJECTION INTRAMUSCULAR; INTRAVENOUS PRN
Status: DISCONTINUED | OUTPATIENT
Start: 2021-03-24 | End: 2021-03-24 | Stop reason: SDUPTHER

## 2021-03-24 RX ORDER — SENNA AND DOCUSATE SODIUM 50; 8.6 MG/1; MG/1
1 TABLET, FILM COATED ORAL 2 TIMES DAILY
Status: DISCONTINUED | OUTPATIENT
Start: 2021-03-24 | End: 2021-03-26 | Stop reason: HOSPADM

## 2021-03-24 RX ORDER — LABETALOL HYDROCHLORIDE 5 MG/ML
10 INJECTION, SOLUTION INTRAVENOUS EVERY 4 HOURS PRN
Status: DISCONTINUED | OUTPATIENT
Start: 2021-03-24 | End: 2021-03-26 | Stop reason: HOSPADM

## 2021-03-24 RX ORDER — INSULIN LISPRO 100 [IU]/ML
0-6 INJECTION, SOLUTION INTRAVENOUS; SUBCUTANEOUS
Status: DISCONTINUED | OUTPATIENT
Start: 2021-03-25 | End: 2021-03-26 | Stop reason: HOSPADM

## 2021-03-24 RX ORDER — SODIUM CHLORIDE 0.9 % (FLUSH) 0.9 %
10 SYRINGE (ML) INJECTION PRN
Status: DISCONTINUED | OUTPATIENT
Start: 2021-03-24 | End: 2021-03-24 | Stop reason: HOSPADM

## 2021-03-24 RX ORDER — DEXAMETHASONE 4 MG/1
4 TABLET ORAL DAILY
Status: DISCONTINUED | OUTPATIENT
Start: 2021-03-28 | End: 2021-03-26 | Stop reason: HOSPADM

## 2021-03-24 RX ORDER — ATORVASTATIN CALCIUM 10 MG/1
10 TABLET, FILM COATED ORAL NIGHTLY
Status: DISCONTINUED | OUTPATIENT
Start: 2021-03-24 | End: 2021-03-26 | Stop reason: HOSPADM

## 2021-03-24 RX ORDER — DEXAMETHASONE 2 MG/1
2 TABLET ORAL 2 TIMES DAILY WITH MEALS
Status: ON HOLD | COMMUNITY
End: 2021-03-26 | Stop reason: HOSPADM

## 2021-03-24 RX ORDER — SODIUM CHLORIDE, SODIUM LACTATE, POTASSIUM CHLORIDE, CALCIUM CHLORIDE 600; 310; 30; 20 MG/100ML; MG/100ML; MG/100ML; MG/100ML
INJECTION, SOLUTION INTRAVENOUS CONTINUOUS
Status: DISCONTINUED | OUTPATIENT
Start: 2021-03-24 | End: 2021-03-24

## 2021-03-24 RX ORDER — HYDROCODONE BITARTRATE AND ACETAMINOPHEN 5; 325 MG/1; MG/1
1 TABLET ORAL
Status: DISCONTINUED | OUTPATIENT
Start: 2021-03-24 | End: 2021-03-24 | Stop reason: HOSPADM

## 2021-03-24 RX ORDER — DEXAMETHASONE 4 MG/1
4 TABLET ORAL EVERY 8 HOURS SCHEDULED
Status: COMPLETED | OUTPATIENT
Start: 2021-03-25 | End: 2021-03-26

## 2021-03-24 RX ORDER — HEPARIN SODIUM 5000 [USP'U]/.5ML
5000 INJECTION, SOLUTION INTRAVENOUS; SUBCUTANEOUS EVERY 8 HOURS
Status: DISCONTINUED | OUTPATIENT
Start: 2021-03-25 | End: 2021-03-25 | Stop reason: CLARIF

## 2021-03-24 RX ORDER — CEFAZOLIN SODIUM 2 G/50ML
2000 SOLUTION INTRAVENOUS EVERY 8 HOURS
Status: DISCONTINUED | OUTPATIENT
Start: 2021-03-24 | End: 2021-03-26 | Stop reason: HOSPADM

## 2021-03-24 RX ORDER — GLYCOPYRROLATE 0.2 MG/ML
INJECTION INTRAMUSCULAR; INTRAVENOUS PRN
Status: DISCONTINUED | OUTPATIENT
Start: 2021-03-24 | End: 2021-03-24 | Stop reason: SDUPTHER

## 2021-03-24 RX ORDER — PROPOFOL 10 MG/ML
INJECTION, EMULSION INTRAVENOUS CONTINUOUS PRN
Status: DISCONTINUED | OUTPATIENT
Start: 2021-03-24 | End: 2021-03-24 | Stop reason: SDUPTHER

## 2021-03-24 RX ORDER — SODIUM CHLORIDE, SODIUM LACTATE, POTASSIUM CHLORIDE, AND CALCIUM CHLORIDE .6; .31; .03; .02 G/100ML; G/100ML; G/100ML; G/100ML
IRRIGANT IRRIGATION PRN
Status: DISCONTINUED | OUTPATIENT
Start: 2021-03-24 | End: 2021-03-24 | Stop reason: HOSPADM

## 2021-03-24 RX ORDER — MAGNESIUM SULFATE HEPTAHYDRATE 500 MG/ML
INJECTION, SOLUTION INTRAMUSCULAR; INTRAVENOUS PRN
Status: DISCONTINUED | OUTPATIENT
Start: 2021-03-24 | End: 2021-03-24 | Stop reason: SDUPTHER

## 2021-03-24 RX ORDER — ACETAMINOPHEN 325 MG/1
650 TABLET ORAL EVERY 4 HOURS PRN
Status: DISCONTINUED | OUTPATIENT
Start: 2021-03-24 | End: 2021-03-26 | Stop reason: HOSPADM

## 2021-03-24 RX ORDER — BISACODYL 10 MG
10 SUPPOSITORY, RECTAL RECTAL DAILY PRN
Status: DISCONTINUED | OUTPATIENT
Start: 2021-03-24 | End: 2021-03-26 | Stop reason: HOSPADM

## 2021-03-24 RX ORDER — MEPERIDINE HYDROCHLORIDE 25 MG/ML
12.5 INJECTION INTRAMUSCULAR; INTRAVENOUS; SUBCUTANEOUS EVERY 5 MIN PRN
Status: DISCONTINUED | OUTPATIENT
Start: 2021-03-24 | End: 2021-03-24 | Stop reason: HOSPADM

## 2021-03-24 RX ORDER — DIPHENHYDRAMINE HYDROCHLORIDE 50 MG/ML
12.5 INJECTION INTRAMUSCULAR; INTRAVENOUS
Status: DISCONTINUED | OUTPATIENT
Start: 2021-03-24 | End: 2021-03-24 | Stop reason: HOSPADM

## 2021-03-24 RX ORDER — OXYCODONE HYDROCHLORIDE 5 MG/1
5 TABLET ORAL EVERY 4 HOURS PRN
Status: DISCONTINUED | OUTPATIENT
Start: 2021-03-24 | End: 2021-03-26 | Stop reason: HOSPADM

## 2021-03-24 RX ORDER — LATANOPROST 50 UG/ML
1 SOLUTION/ DROPS OPHTHALMIC NIGHTLY
Status: DISCONTINUED | OUTPATIENT
Start: 2021-03-24 | End: 2021-03-26 | Stop reason: HOSPADM

## 2021-03-24 RX ORDER — INSULIN LISPRO 100 [IU]/ML
0-3 INJECTION, SOLUTION INTRAVENOUS; SUBCUTANEOUS NIGHTLY
Status: DISCONTINUED | OUTPATIENT
Start: 2021-03-24 | End: 2021-03-26 | Stop reason: HOSPADM

## 2021-03-24 RX ORDER — SODIUM CHLORIDE 0.9 % (FLUSH) 0.9 %
10 SYRINGE (ML) INJECTION EVERY 12 HOURS SCHEDULED
Status: DISCONTINUED | OUTPATIENT
Start: 2021-03-24 | End: 2021-03-24 | Stop reason: HOSPADM

## 2021-03-24 RX ORDER — DEXTROSE MONOHYDRATE 50 MG/ML
100 INJECTION, SOLUTION INTRAVENOUS PRN
Status: DISCONTINUED | OUTPATIENT
Start: 2021-03-24 | End: 2021-03-26 | Stop reason: HOSPADM

## 2021-03-24 RX ORDER — ONDANSETRON 2 MG/ML
4 INJECTION INTRAMUSCULAR; INTRAVENOUS
Status: DISCONTINUED | OUTPATIENT
Start: 2021-03-24 | End: 2021-03-24 | Stop reason: HOSPADM

## 2021-03-24 RX ORDER — POLYETHYLENE GLYCOL 3350 17 G/17G
17 POWDER, FOR SOLUTION ORAL DAILY
Status: DISCONTINUED | OUTPATIENT
Start: 2021-03-24 | End: 2021-03-26 | Stop reason: HOSPADM

## 2021-03-24 RX ORDER — PROMETHAZINE HYDROCHLORIDE 25 MG/1
12.5 TABLET ORAL EVERY 6 HOURS PRN
Status: DISCONTINUED | OUTPATIENT
Start: 2021-03-24 | End: 2021-03-26 | Stop reason: HOSPADM

## 2021-03-24 RX ORDER — DEXTROSE MONOHYDRATE 25 G/50ML
12.5 INJECTION, SOLUTION INTRAVENOUS PRN
Status: DISCONTINUED | OUTPATIENT
Start: 2021-03-24 | End: 2021-03-26 | Stop reason: HOSPADM

## 2021-03-24 RX ORDER — MAGNESIUM HYDROXIDE/ALUMINUM HYDROXICE/SIMETHICONE 120; 1200; 1200 MG/30ML; MG/30ML; MG/30ML
15 SUSPENSION ORAL EVERY 6 HOURS PRN
Status: DISCONTINUED | OUTPATIENT
Start: 2021-03-24 | End: 2021-03-26 | Stop reason: HOSPADM

## 2021-03-24 RX ORDER — PROCHLORPERAZINE EDISYLATE 5 MG/ML
5 INJECTION INTRAMUSCULAR; INTRAVENOUS
Status: DISCONTINUED | OUTPATIENT
Start: 2021-03-24 | End: 2021-03-24 | Stop reason: HOSPADM

## 2021-03-24 RX ORDER — NALOXONE HYDROCHLORIDE 0.4 MG/ML
0.2 INJECTION, SOLUTION INTRAMUSCULAR; INTRAVENOUS; SUBCUTANEOUS PRN
Status: DISCONTINUED | OUTPATIENT
Start: 2021-03-24 | End: 2021-03-26 | Stop reason: HOSPADM

## 2021-03-24 RX ORDER — LIDOCAINE HYDROCHLORIDE AND EPINEPHRINE 10; 10 MG/ML; UG/ML
INJECTION, SOLUTION INFILTRATION; PERINEURAL PRN
Status: DISCONTINUED | OUTPATIENT
Start: 2021-03-24 | End: 2021-03-24 | Stop reason: HOSPADM

## 2021-03-24 RX ORDER — POTASSIUM CHLORIDE 7.45 MG/ML
10 INJECTION INTRAVENOUS PRN
Status: DISCONTINUED | OUTPATIENT
Start: 2021-03-24 | End: 2021-03-26 | Stop reason: HOSPADM

## 2021-03-24 RX ORDER — SODIUM CHLORIDE 9 MG/ML
INJECTION, SOLUTION INTRAVENOUS CONTINUOUS
Status: DISCONTINUED | OUTPATIENT
Start: 2021-03-24 | End: 2021-03-25

## 2021-03-24 RX ORDER — CEFAZOLIN SODIUM 2 G/50ML
2000 SOLUTION INTRAVENOUS ONCE
Status: COMPLETED | OUTPATIENT
Start: 2021-03-24 | End: 2021-03-24

## 2021-03-24 RX ORDER — DEXTROSE MONOHYDRATE 25 G/50ML
12.5 INJECTION, SOLUTION INTRAVENOUS PRN
Status: DISCONTINUED | OUTPATIENT
Start: 2021-03-24 | End: 2021-03-24

## 2021-03-24 RX ORDER — OXYCODONE HYDROCHLORIDE 5 MG/1
10 TABLET ORAL EVERY 4 HOURS PRN
Status: DISCONTINUED | OUTPATIENT
Start: 2021-03-24 | End: 2021-03-26 | Stop reason: HOSPADM

## 2021-03-24 RX ORDER — ONDANSETRON 2 MG/ML
4 INJECTION INTRAMUSCULAR; INTRAVENOUS EVERY 6 HOURS PRN
Status: DISCONTINUED | OUTPATIENT
Start: 2021-03-24 | End: 2021-03-26 | Stop reason: HOSPADM

## 2021-03-24 RX ADMIN — PROPOFOL 150 MCG/KG/MIN: 10 INJECTION, EMULSION INTRAVENOUS at 11:51

## 2021-03-24 RX ADMIN — SODIUM CHLORIDE, POTASSIUM CHLORIDE, SODIUM LACTATE AND CALCIUM CHLORIDE: 600; 310; 30; 20 INJECTION, SOLUTION INTRAVENOUS at 13:52

## 2021-03-24 RX ADMIN — CEFAZOLIN SODIUM 2000 MG: 2 SOLUTION INTRAVENOUS at 12:30

## 2021-03-24 RX ADMIN — ATORVASTATIN CALCIUM 10 MG: 10 TABLET, FILM COATED ORAL at 20:35

## 2021-03-24 RX ADMIN — FAMOTIDINE 20 MG: 10 INJECTION, SOLUTION INTRAVENOUS at 11:49

## 2021-03-24 RX ADMIN — HYDROMORPHONE HYDROCHLORIDE 0.5 MG: 1 INJECTION, SOLUTION INTRAMUSCULAR; INTRAVENOUS; SUBCUTANEOUS at 20:27

## 2021-03-24 RX ADMIN — PROPOFOL 150 MG: 10 INJECTION, EMULSION INTRAVENOUS at 11:49

## 2021-03-24 RX ADMIN — LATANOPROST 1 DROP: 50 SOLUTION OPHTHALMIC at 22:10

## 2021-03-24 RX ADMIN — REMIFENTANIL HYDROCHLORIDE 0.2 MCG/KG/MIN: 1 INJECTION, POWDER, LYOPHILIZED, FOR SOLUTION INTRAVENOUS at 11:49

## 2021-03-24 RX ADMIN — CEFAZOLIN SODIUM 2000 MG: 2 SOLUTION INTRAVENOUS at 22:11

## 2021-03-24 RX ADMIN — Medication 0.5 MG: at 20:27

## 2021-03-24 RX ADMIN — LEVETIRACETAM 500 MG: 500 TABLET ORAL at 20:35

## 2021-03-24 RX ADMIN — PHENYLEPHRINE HYDROCHLORIDE 10 MCG/MIN: 10 INJECTION, SOLUTION INTRAMUSCULAR; INTRAVENOUS; SUBCUTANEOUS at 12:03

## 2021-03-24 RX ADMIN — DEXAMETHASONE SODIUM PHOSPHATE 10 MG: 4 INJECTION, SOLUTION INTRAMUSCULAR; INTRAVENOUS at 11:49

## 2021-03-24 RX ADMIN — DOCUSATE SODIUM 50 MG AND SENNOSIDES 8.6 MG 1 TABLET: 8.6; 5 TABLET, FILM COATED ORAL at 20:35

## 2021-03-24 RX ADMIN — DEXAMETHASONE 4 MG: 4 TABLET ORAL at 20:35

## 2021-03-24 RX ADMIN — SODIUM CHLORIDE: 9 INJECTION, SOLUTION INTRAVENOUS at 20:01

## 2021-03-24 RX ADMIN — LEVETIRACETAM 1000 MG: 100 INJECTION, SOLUTION INTRAVENOUS at 12:00

## 2021-03-24 RX ADMIN — GLYCOPYRROLATE 0.3 MG: 0.2 INJECTION INTRAMUSCULAR; INTRAVENOUS at 12:59

## 2021-03-24 RX ADMIN — SUCCINYLCHOLINE CHLORIDE 140 MG: 20 INJECTION, SOLUTION INTRAMUSCULAR; INTRAVENOUS; PARENTERAL at 11:50

## 2021-03-24 RX ADMIN — SODIUM CHLORIDE, POTASSIUM CHLORIDE, SODIUM LACTATE AND CALCIUM CHLORIDE: 600; 310; 30; 20 INJECTION, SOLUTION INTRAVENOUS at 10:31

## 2021-03-24 RX ADMIN — FENTANYL CITRATE 100 MCG: 50 INJECTION, SOLUTION INTRAMUSCULAR; INTRAVENOUS at 11:49

## 2021-03-24 RX ADMIN — SODIUM CHLORIDE, POTASSIUM CHLORIDE, SODIUM LACTATE AND CALCIUM CHLORIDE: 600; 310; 30; 20 INJECTION, SOLUTION INTRAVENOUS at 12:25

## 2021-03-24 RX ADMIN — MANNITOL 40 G: 12.5 INJECTION, SOLUTION INTRAVENOUS at 12:15

## 2021-03-24 RX ADMIN — POTASSIUM CHLORIDE 10 MEQ: 10 INJECTION, SOLUTION INTRAVENOUS at 14:15

## 2021-03-24 RX ADMIN — ONDANSETRON 4 MG: 2 INJECTION INTRAMUSCULAR; INTRAVENOUS at 11:49

## 2021-03-24 RX ADMIN — FERROUS SULFATE TAB 325 MG (65 MG ELEMENTAL FE) 325 MG: 325 (65 FE) TAB at 20:36

## 2021-03-24 RX ADMIN — MAGNESIUM SULFATE HEPTAHYDRATE 2 G: 500 INJECTION, SOLUTION INTRAMUSCULAR; INTRAVENOUS at 14:17

## 2021-03-24 RX ADMIN — LIDOCAINE HYDROCHLORIDE 100 MG: 20 INJECTION, SOLUTION INFILTRATION; PERINEURAL at 11:49

## 2021-03-24 ASSESSMENT — PULMONARY FUNCTION TESTS
PIF_VALUE: 23
PIF_VALUE: 28
PIF_VALUE: 27
PIF_VALUE: 20
PIF_VALUE: 27
PIF_VALUE: 27
PIF_VALUE: 2
PIF_VALUE: 27
PIF_VALUE: 29
PIF_VALUE: 27
PIF_VALUE: 26
PIF_VALUE: 27
PIF_VALUE: 31
PIF_VALUE: 31
PIF_VALUE: 27
PIF_VALUE: 26
PIF_VALUE: 27
PIF_VALUE: 27
PIF_VALUE: 26
PIF_VALUE: 27
PIF_VALUE: 33
PIF_VALUE: 27
PIF_VALUE: 39
PIF_VALUE: 27
PIF_VALUE: 27
PIF_VALUE: 31
PIF_VALUE: 27
PIF_VALUE: 27
PIF_VALUE: 26
PIF_VALUE: 27
PIF_VALUE: 26
PIF_VALUE: 28
PIF_VALUE: 27
PIF_VALUE: 28
PIF_VALUE: 26
PIF_VALUE: 27
PIF_VALUE: 28
PIF_VALUE: 27
PIF_VALUE: 27
PIF_VALUE: 26
PIF_VALUE: 33
PIF_VALUE: 28
PIF_VALUE: 27
PIF_VALUE: 26
PIF_VALUE: 27
PIF_VALUE: 26
PIF_VALUE: 27
PIF_VALUE: 26
PIF_VALUE: 27
PIF_VALUE: 28
PIF_VALUE: 27
PIF_VALUE: 27
PIF_VALUE: 30
PIF_VALUE: 25
PIF_VALUE: 26
PIF_VALUE: 27
PIF_VALUE: 31
PIF_VALUE: 27
PIF_VALUE: 28
PIF_VALUE: 27
PIF_VALUE: 32
PIF_VALUE: 27
PIF_VALUE: 26
PIF_VALUE: 27
PIF_VALUE: 29
PIF_VALUE: 2
PIF_VALUE: 29
PIF_VALUE: 27
PIF_VALUE: 28
PIF_VALUE: 13
PIF_VALUE: 27
PIF_VALUE: 30
PIF_VALUE: 29
PIF_VALUE: 2
PIF_VALUE: 27
PIF_VALUE: 27
PIF_VALUE: 36
PIF_VALUE: 27
PIF_VALUE: 3
PIF_VALUE: 28
PIF_VALUE: 27
PIF_VALUE: 29
PIF_VALUE: 27
PIF_VALUE: 27
PIF_VALUE: 28
PIF_VALUE: 27

## 2021-03-24 ASSESSMENT — LIFESTYLE VARIABLES: SMOKING_STATUS: 1

## 2021-03-24 ASSESSMENT — PAIN SCALES - GENERAL: PAINLEVEL_OUTOF10: 6

## 2021-03-24 ASSESSMENT — ENCOUNTER SYMPTOMS: SHORTNESS OF BREATH: 1

## 2021-03-24 NOTE — ANESTHESIA PROCEDURE NOTES
Arterial Line:    An arterial line was placed using surface landmarks, in the OR for the following indication(s): continuous blood pressure monitoring and blood sampling needed. A 20 gauge (size), 1 and 3/4 inch (length), Arrow (type) catheter was placed, Seldinger technique used, into the left radial artery, secured by tape and Tegaderm and bio patch . Anesthesia type: General    Events:  patient tolerated procedure well with no complications and EBL < 5mL.   3/24/2021 12:00 PM3/24/2021 12:01 PM  Resident/CRNA: PATRICIA Garcia CRNA  Performed: Resident/CRNA   Preanesthetic Checklist  Completed: patient identified, IV checked, site marked, risks and benefits discussed, surgical consent, monitors and equipment checked, pre-op evaluation, timeout performed, anesthesia consent given, oxygen available and patient being monitored

## 2021-03-24 NOTE — BRIEF OP NOTE
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 LEFT PARIETAL MASS Tissue Tissue SURGICAL PATHOLOGY Theresa Easley MD 3/24/2021 1417    B : B.  LEFT PARIETAL MASS Tissue Tissue SURGICAL PATHOLOGY Theresa Easley MD 3/24/2021 1523        Implants:  * No implants in log *      Drains:   Urethral Catheter 16 fr (Active)       Findings: large cystic mass    Electronically signed by Theresa Easley MD on 3/24/2021 at 4:23 PM

## 2021-03-24 NOTE — ANESTHESIA PRE PROCEDURE
Continuous Gurjit Salmons,  mL/hr at 21 1031 New Bag at 21 1031       Allergies: Allergies   Allergen Reactions    Ibuprofen      GI issues    Naprosyn [Naproxen]     Codeine Nausea And Vomiting and Other (See Comments)     ABDOMINAL CRAMPING       Problem List:    Patient Active Problem List   Diagnosis Code    Chest pain R07.9    SELF (dyspnea on exertion) R06.00    Acute bronchitis J20.9    DM II (diabetes mellitus, type II), controlled ([facility] [address] E11.9    Osteoarthritis M19.90    Glaucoma H40.9    Unilateral congenital absence of kidney Q60.0    Hyperlipidemia, unspecified E78.5    Chronic GERD K21.9       Past Medical History:        Diagnosis Date    Arthritis     Back pain     Blurred vision     Chronic GERD     Congenital absence of one kidney     Diabetes mellitus ([facility] [address]     TYPE 2    Difficulty speaking     Glaucoma     Hyperlipidemia, unspecified     Hypertension     Memory loss     Prolonged emergence from general anesthesia        Past Surgical History:        Procedure Laterality Date    CHOLECYSTECTOMY      COLONOSCOPY      CYST REMOVAL      BREAST    EYE SURGERY      cataract removal    HYSTERECTOMY      KNEE SURGERY         Social History:    Social History     Tobacco Use    Smoking status: Former Smoker     Quit date: 3/12/2003     Years since quittin.0    Smokeless tobacco: Never Used   Substance Use Topics    Alcohol use:  No                                Counseling given: Not Answered      Vital Signs (Current):   Vitals:    21 1527 21 1017   BP:  126/81   Pulse:  83   Resp:  16   Temp:  98.4 °F (36.9 °C)   TempSrc:  Oral   SpO2:  98%   Weight: 235 lb (106.6 kg) 235 lb (106.6 kg)   Height: 5' 2\" (1.575 m) 5' 2\" (1.575 m)                                              BP Readings from Last 3 Encounters:   21 126/81   18 122/89   18 (!) 151/92       NPO Status: Time of last liquid consumption:

## 2021-03-24 NOTE — OP NOTE
OPERATIVE REPORT    Patient Name: Chestine Phalen  MRN: [id number]  : 1950  DOS: 2021    PRE-OPERATIVE DIAGNOSIS: Left parieto-occipital mass    POST-OPERATIVE DIAGNOSIS: Left parieto-occipital metastasis    OPERATIVE PROCEDURES PERFORMED:  1. Stereotactic left parieto-occipital craniotomy for resection of tumor  2. Intraoperative neuromonitoring (MEP, SSEP, EEG)  3. Intraoperative Brainlab neuronavigation    SURGEON: Gerry Mcintosh M.D., Ph.D.    [facility] August: MCKINLEY Mitchell    Ms. Anson Mullins served as assistant on this surgery due to the complex nature of the procedure and the lack of a resident or fellow assistant. She provided assistance with opening, manipulation of critical neural elements, and closing. ANESTHESIA: General endotracheal    ESTIMATED BLOOD LOSS:  200 mL. DRAINS: None    IMPLANTS:   1. Synthes cranial plating system    SPECIMEN:  1. Left occipital tumor for permanent histology     DISPOSITION:  PACU in stable condition. INDICATIONS:  Ms Jesica Blanchard is a 79year old woman who presented with confusion, aphsia, and memory impairment. On workup, she was found to have a large left parieto-occipital mass with extensive surrounding vasogenic edema as well as a lung mass and two other right-sided smaller ring-enhancing lesions. Given the patients symptoms, the absence of a diagnosis, and the size of the lesion, surgical resection was recommended. The patient understands the risks and benefits of the procedure including but not limited to bleeding, infection, loss of vision, cognitive dysfunction, worsened neurologic injury, vascular injury, cerebral spinal fluid leak, inability to completely remove the mass, anesthesia risks, and death. PROCEDURES IN DETAIL:      Under universal protocol and informed consent, the patient was brought to the operating room. General anesthesia was induced and the patient was intubated.  She was positioned in a lateral position with the left side up. A medium axillary roll was placed under the right side and all pressure points were appropriately padded. The left shoulder was pulled down and away using silk tape and the left arm was positioned on pillows in front of the patient. The patient was then placed in [address] fixation with the head turned to the right. Brainlab Image Guidance was registered and verified. A linear paramedian occipitoparietal incision was planned and shaved using neuronavigation to establish the optimum trajectory to the lesion. A Dumont catheter and a radial arterial line were placed. Neuromonitoring leads for SSEPs, MEPs, and EEG monitoring were placed. Intravenous antibiotics (Ancef), Decadron, Keppra, and Mannitol were given by anesthesia. A time out was completed and the surgical site was prepped and draped in the usual sterile fashion. After infiltration of the skin with 0.5% Lidocaine with 1:100,000 Epinephrine, the cranial incision was made with a #15 scalpel. Bovie electrocautery was then used to complete the dissection down to the occipital and parietal bones. After verifying the position of the tumor, a 4 mm cutter ball bit on a [employer] drill was used to drill three bur holes: at the inferomedial and superomedial aspect of the mass just lateral to the superior sagittal sinus, and at the lateral aspect of the mass. Dura was dissected away from the bone using a #3 Penfield dissector without difficulty and a craniotomy was turned with the B1 footplate. The bone flap was elevated with a #1 Penfield dissector, and the underlying dura remained intact. Finally, using a #11 scalpel and Metzenbaum scissors, a C-shaped dural incision was made based medially at the superior sagittal sinus. The dural flap was then reflected medially and secured with 4-0 Nurolon sutures. At this point, the tumor borders were established using intraoperative Brainlab strereotactic neuronavigation.  A corticetomy was made overlying

## 2021-03-24 NOTE — PROGRESS NOTES
Patient admitted to pacu post LEFT PARIETO-OCCIPITAL CRANIOTOMY FOR TUMOR RESECTION - Left with Dr. Annel Wallace. Patient connected to bedside monitors; VSS. Per CRNA patient was stable intraop. Patient resting comfortably with no complaints; is able to follow commands.

## 2021-03-24 NOTE — H&P
None     Minutes per session: None    Stress: None   Relationships    Social connections     Talks on phone: None     Gets together: None     Attends Sikhism service: None     Active member of club or organization: None     Attends meetings of clubs or organizations: None     Relationship status: None    Intimate partner violence     Fear of current or ex partner: None     Emotionally abused: None     Physically abused: None     Forced sexual activity: None   Other Topics Concern    None   Social History Narrative    None         Medications Prior to Admission:      Prior to Admission medications    Medication Sig Start Date End Date Taking? Authorizing Provider   dexamethasone (DECADRON) 2 MG tablet Take 2 mg by mouth 2 times daily (with meals)   Yes Historical Provider, MD   levETIRAcetam (KEPPRA) 500 MG tablet Take 500 mg by mouth 2 times daily   Yes Historical Provider, MD   pantoprazole (PROTONIX) 40 MG tablet nightly  1/22/18  Yes Historical Provider, MD   ferrous sulfate 325 (65 Fe) MG tablet Take 325 mg by mouth 2 times daily    Yes Historical Provider, MD   latanoprost (XALATAN) 0.005 % ophthalmic solution 1 drop nightly. Yes Historical Provider, MD   metFORMIN (GLUCOPHAGE) 500 MG tablet Take 500 mg by mouth daily (with breakfast)    Yes Historical Provider, MD   simvastatin (ZOCOR) 20 MG tablet Take 20 mg by mouth nightly. Yes Historical Provider, MD   aspirin 81 MG chewable tablet Take 81 mg by mouth daily.     Historical Provider, MD         Allergies:  Ibuprofen, Naprosyn [naproxen], and Codeine    PHYSICAL EXAM:      /81   Pulse 83   Temp 98.4 °F (36.9 °C) (Oral)   Resp 16   Ht 5' 2\" (1.575 m)   Wt 235 lb (106.6 kg)   SpO2 98%   BMI 42.98 kg/m²      Airway:  Airway patent with no audible stridor    Heart:  regular rate and rhythm, No murmur noted    Lungs:  No increased work of breathing, good air exchange, clear to auscultation bilaterally, no crackles or wheezing    Abdomen: soft, non-distended, non-tender, no rebound tenderness or guarding, normal active bowel sounds and no masses palpated    ASSESSMENT AND PLAN     Patient is a 79 y.o. female with above specified procedure planned. 1.  The patients history and physical was obtained and signed off by the pre-admission testing department. Patient seen and focused exam done today- no new changes since last physical exam on 2/25/21    2. Access to ancillary services are available per request of the provider.     Aaron Covington, PATRICIA - CNP     3/24/2021

## 2021-03-25 ENCOUNTER — APPOINTMENT (OUTPATIENT)
Dept: MRI IMAGING | Age: 71
DRG: 025 | End: 2021-03-25
Attending: NEUROLOGICAL SURGERY
Payer: MEDICARE

## 2021-03-25 LAB
ANION GAP SERPL CALCULATED.3IONS-SCNC: 13 MMOL/L (ref 3–16)
ANION GAP SERPL CALCULATED.3IONS-SCNC: 9 MMOL/L (ref 3–16)
BUN BLDV-MCNC: 5 MG/DL (ref 7–20)
BUN BLDV-MCNC: 5 MG/DL (ref 7–20)
CALCIUM SERPL-MCNC: 8.8 MG/DL (ref 8.3–10.6)
CALCIUM SERPL-MCNC: 9 MG/DL (ref 8.3–10.6)
CHLORIDE BLD-SCNC: 105 MMOL/L (ref 99–110)
CHLORIDE BLD-SCNC: 109 MMOL/L (ref 99–110)
CO2: 20 MMOL/L (ref 21–32)
CO2: 23 MMOL/L (ref 21–32)
CREAT SERPL-MCNC: 0.7 MG/DL (ref 0.6–1.2)
CREAT SERPL-MCNC: 0.7 MG/DL (ref 0.6–1.2)
GFR AFRICAN AMERICAN: >60
GFR AFRICAN AMERICAN: >60
GFR NON-AFRICAN AMERICAN: >60
GFR NON-AFRICAN AMERICAN: >60
GLUCOSE BLD-MCNC: 144 MG/DL (ref 70–99)
GLUCOSE BLD-MCNC: 145 MG/DL (ref 70–99)
GLUCOSE BLD-MCNC: 150 MG/DL (ref 70–99)
GLUCOSE BLD-MCNC: 152 MG/DL (ref 70–99)
GLUCOSE BLD-MCNC: 171 MG/DL (ref 70–99)
GLUCOSE BLD-MCNC: 173 MG/DL (ref 70–99)
HCT VFR BLD CALC: 48 % (ref 36–48)
HEMOGLOBIN: 16 G/DL (ref 12–16)
MAGNESIUM: 1.9 MG/DL (ref 1.8–2.4)
MCH RBC QN AUTO: 31.7 PG (ref 26–34)
MCHC RBC AUTO-ENTMCNC: 33.2 G/DL (ref 31–36)
MCV RBC AUTO: 95.4 FL (ref 80–100)
PDW BLD-RTO: 13.8 % (ref 12.4–15.4)
PERFORMED ON: ABNORMAL
PLATELET # BLD: 208 K/UL (ref 135–450)
PMV BLD AUTO: 10.1 FL (ref 5–10.5)
POTASSIUM SERPL-SCNC: 3.4 MMOL/L (ref 3.5–5.1)
POTASSIUM SERPL-SCNC: 3.7 MMOL/L (ref 3.5–5.1)
RBC # BLD: 5.03 M/UL (ref 4–5.2)
SODIUM BLD-SCNC: 138 MMOL/L (ref 136–145)
SODIUM BLD-SCNC: 141 MMOL/L (ref 136–145)
WBC # BLD: 13.1 K/UL (ref 4–11)

## 2021-03-25 PROCEDURE — A9576 INJ PROHANCE MULTIPACK: HCPCS | Performed by: NEUROLOGICAL SURGERY

## 2021-03-25 PROCEDURE — 6370000000 HC RX 637 (ALT 250 FOR IP): Performed by: INTERNAL MEDICINE

## 2021-03-25 PROCEDURE — 70553 MRI BRAIN STEM W/O & W/DYE: CPT

## 2021-03-25 PROCEDURE — 6360000002 HC RX W HCPCS: Performed by: INTERNAL MEDICINE

## 2021-03-25 PROCEDURE — 6370000000 HC RX 637 (ALT 250 FOR IP): Performed by: NURSE PRACTITIONER

## 2021-03-25 PROCEDURE — 6360000004 HC RX CONTRAST MEDICATION: Performed by: NEUROLOGICAL SURGERY

## 2021-03-25 PROCEDURE — 83036 HEMOGLOBIN GLYCOSYLATED A1C: CPT

## 2021-03-25 PROCEDURE — 6360000002 HC RX W HCPCS: Performed by: NEUROLOGICAL SURGERY

## 2021-03-25 PROCEDURE — 80048 BASIC METABOLIC PNL TOTAL CA: CPT

## 2021-03-25 PROCEDURE — 97166 OT EVAL MOD COMPLEX 45 MIN: CPT

## 2021-03-25 PROCEDURE — 36415 COLL VENOUS BLD VENIPUNCTURE: CPT

## 2021-03-25 PROCEDURE — 94664 DEMO&/EVAL PT USE INHALER: CPT

## 2021-03-25 PROCEDURE — 2060000000 HC ICU INTERMEDIATE R&B

## 2021-03-25 PROCEDURE — 97116 GAIT TRAINING THERAPY: CPT

## 2021-03-25 PROCEDURE — 2500000003 HC RX 250 WO HCPCS: Performed by: INTERNAL MEDICINE

## 2021-03-25 PROCEDURE — 97162 PT EVAL MOD COMPLEX 30 MIN: CPT

## 2021-03-25 PROCEDURE — 94761 N-INVAS EAR/PLS OXIMETRY MLT: CPT

## 2021-03-25 PROCEDURE — 6370000000 HC RX 637 (ALT 250 FOR IP): Performed by: NEUROLOGICAL SURGERY

## 2021-03-25 PROCEDURE — 2700000000 HC OXYGEN THERAPY PER DAY

## 2021-03-25 PROCEDURE — 97530 THERAPEUTIC ACTIVITIES: CPT

## 2021-03-25 PROCEDURE — 85027 COMPLETE CBC AUTOMATED: CPT

## 2021-03-25 PROCEDURE — 94150 VITAL CAPACITY TEST: CPT

## 2021-03-25 RX ORDER — AMLODIPINE BESYLATE 5 MG/1
5 TABLET ORAL DAILY
Status: DISCONTINUED | OUTPATIENT
Start: 2021-03-25 | End: 2021-03-26 | Stop reason: HOSPADM

## 2021-03-25 RX ORDER — DIAZEPAM 5 MG/1
5 TABLET ORAL ONCE
Status: COMPLETED | OUTPATIENT
Start: 2021-03-25 | End: 2021-03-25

## 2021-03-25 RX ORDER — HEPARIN SODIUM 5000 [USP'U]/ML
5000 INJECTION, SOLUTION INTRAVENOUS; SUBCUTANEOUS EVERY 8 HOURS SCHEDULED
Status: DISCONTINUED | OUTPATIENT
Start: 2021-03-25 | End: 2021-03-26 | Stop reason: HOSPADM

## 2021-03-25 RX ADMIN — DEXAMETHASONE 4 MG: 4 TABLET ORAL at 20:47

## 2021-03-25 RX ADMIN — FERROUS SULFATE TAB 325 MG (65 MG ELEMENTAL FE) 325 MG: 325 (65 FE) TAB at 20:47

## 2021-03-25 RX ADMIN — DOCUSATE SODIUM 50 MG AND SENNOSIDES 8.6 MG 1 TABLET: 8.6; 5 TABLET, FILM COATED ORAL at 08:04

## 2021-03-25 RX ADMIN — GADOTERIDOL 20 ML: 279.3 INJECTION, SOLUTION INTRAVENOUS at 20:00

## 2021-03-25 RX ADMIN — PANTOPRAZOLE SODIUM 40 MG: 40 TABLET, DELAYED RELEASE ORAL at 06:13

## 2021-03-25 RX ADMIN — POTASSIUM CHLORIDE 10 MEQ: 7.46 INJECTION, SOLUTION INTRAVENOUS at 07:23

## 2021-03-25 RX ADMIN — FERROUS SULFATE TAB 325 MG (65 MG ELEMENTAL FE) 325 MG: 325 (65 FE) TAB at 08:04

## 2021-03-25 RX ADMIN — CEFAZOLIN SODIUM 2000 MG: 2 SOLUTION INTRAVENOUS at 15:58

## 2021-03-25 RX ADMIN — POTASSIUM CHLORIDE 10 MEQ: 7.46 INJECTION, SOLUTION INTRAVENOUS at 00:37

## 2021-03-25 RX ADMIN — INSULIN LISPRO 1 UNITS: 100 INJECTION, SOLUTION INTRAVENOUS; SUBCUTANEOUS at 08:07

## 2021-03-25 RX ADMIN — INSULIN LISPRO 1 UNITS: 100 INJECTION, SOLUTION INTRAVENOUS; SUBCUTANEOUS at 18:08

## 2021-03-25 RX ADMIN — CEFAZOLIN SODIUM 2000 MG: 2 SOLUTION INTRAVENOUS at 20:46

## 2021-03-25 RX ADMIN — LABETALOL HYDROCHLORIDE 10 MG: 5 INJECTION, SOLUTION INTRAVENOUS at 01:49

## 2021-03-25 RX ADMIN — HEPARIN SODIUM 5000 UNITS: 5000 INJECTION INTRAVENOUS; SUBCUTANEOUS at 15:59

## 2021-03-25 RX ADMIN — DOCUSATE SODIUM 50 MG AND SENNOSIDES 8.6 MG 1 TABLET: 8.6; 5 TABLET, FILM COATED ORAL at 20:47

## 2021-03-25 RX ADMIN — INSULIN LISPRO 1 UNITS: 100 INJECTION, SOLUTION INTRAVENOUS; SUBCUTANEOUS at 20:47

## 2021-03-25 RX ADMIN — ATORVASTATIN CALCIUM 10 MG: 10 TABLET, FILM COATED ORAL at 20:47

## 2021-03-25 RX ADMIN — DEXAMETHASONE 4 MG: 4 TABLET ORAL at 00:22

## 2021-03-25 RX ADMIN — DIAZEPAM 5 MG: 5 TABLET ORAL at 18:53

## 2021-03-25 RX ADMIN — DEXAMETHASONE 4 MG: 4 TABLET ORAL at 06:13

## 2021-03-25 RX ADMIN — POTASSIUM CHLORIDE 10 MEQ: 7.46 INJECTION, SOLUTION INTRAVENOUS at 05:13

## 2021-03-25 RX ADMIN — LEVETIRACETAM 500 MG: 500 TABLET ORAL at 08:04

## 2021-03-25 RX ADMIN — AMLODIPINE BESYLATE 5 MG: 5 TABLET ORAL at 10:00

## 2021-03-25 RX ADMIN — DEXAMETHASONE 4 MG: 4 TABLET ORAL at 15:58

## 2021-03-25 RX ADMIN — LABETALOL HYDROCHLORIDE 10 MG: 5 INJECTION, SOLUTION INTRAVENOUS at 06:13

## 2021-03-25 RX ADMIN — POTASSIUM CHLORIDE 10 MEQ: 7.46 INJECTION, SOLUTION INTRAVENOUS at 02:42

## 2021-03-25 RX ADMIN — LEVETIRACETAM 500 MG: 500 TABLET ORAL at 20:47

## 2021-03-25 RX ADMIN — CEFAZOLIN SODIUM 2000 MG: 2 SOLUTION INTRAVENOUS at 05:11

## 2021-03-25 RX ADMIN — HEPARIN SODIUM 5000 UNITS: 5000 INJECTION INTRAVENOUS; SUBCUTANEOUS at 20:47

## 2021-03-25 ASSESSMENT — PAIN SCALES - GENERAL
PAINLEVEL_OUTOF10: 0

## 2021-03-25 NOTE — ANESTHESIA POSTPROCEDURE EVALUATION
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        Anesthesia Post Evaluation    Patient location during evaluation: PACU  Patient participation: complete - patient participated  Level of consciousness: awake and alert  Pain score: 0  Airway patency: patent  Nausea & Vomiting: no nausea and no vomiting  Complications: no  Cardiovascular status: hemodynamically stable  Respiratory status: acceptable  Hydration status: euvolemic

## 2021-03-25 NOTE — PROGRESS NOTES
Bedside report done with Sonlai Hernandez. Pt in bed and denies needs. VSS. Pt is A/O x4. Pt denies pain. Pt A/O x4. Incision to the posterior head CDI. Staples intact. Bed alarm on, wheels locked, bed in lowest position, side rails up 2/4, nonskid socks on, call light and bedside table in reach. Will continue to monitor.

## 2021-03-25 NOTE — PROGRESS NOTES
4 Eyes Admission Assessment     I agree as the admission nurse that 2 RN's have performed a thorough Head to Toe Skin Assessment on the patient. ALL assessment sites listed below have been assessed on admission. Areas assessed by both nurses:   [x]   Head, Face, and Ears   [x]   Shoulders, Back, and Chest  [x]   Arms, Elbows, and Hands   [x]   Coccyx, Sacrum, and Ischium  [x]   Legs, Feet, and Heels        Does the Patient have Skin Breakdown?   No         Addi Prevention initiated:  No   Wound Care Orders initiated:  No      [facility] nurse consulted for Pressure Injury (Stage 3,4, Unstageable, DTI, NWPT, and Complex wounds) or Addi score 18 or lower:  No      Nurse 1 eSignature: Electronically signed by Tamika Motta RN on 3/24/21 at 8:58 PM EDT    **SHARE this note so that the co-signing nurse is able to place an eSignature**    Nurse 2 eSignature: {Esignature:[id number]}

## 2021-03-25 NOTE — PROGRESS NOTES
Hospitalist Progress Note      PCP: Azar Marte MD    Date of Admission: 3/24/2021    CC brain mass    Hospital course  Patient is 54-year-old female with a history of diabetes mellitus type 2 admitted for left parieto-occipital mass tumor resection. S/p left parieto-occipital craniotomy for resection of tumor on 3/24    Subjective  Patient seen and examined today. Denies any headache, nausea, vomiting, fever, chills. No acute event reported overnight. Patient is still requiring as needed medication for blood pressure control.     Medications:  Reviewed    Infusion Medications    dextrose       Scheduled Medications    amLODIPine  5 mg Oral Daily    diazePAM  5 mg Oral Once    ferrous sulfate  325 mg Oral BID    latanoprost  1 drop Both Eyes Nightly    levETIRAcetam  500 mg Oral BID    pantoprazole  40 mg Oral QAM AC    atorvastatin  10 mg Oral Nightly    sodium chloride flush  10 mL Intravenous 2 times per day    ceFAZolin (ANCEF) IVPB  2,000 mg Intravenous Q8H    polyethylene glycol  17 g Oral Daily    sennosides-docusate sodium  1 tablet Oral BID    heparin (porcine) PF  5,000 Units Subcutaneous Q8H    dexamethasone  4 mg Oral 4 times per day    Followed by    dexamethasone  4 mg Oral 3 times per day    Followed by   Darren Vuong ON 3/26/2021] dexamethasone  4 mg Oral 2 times per day    Followed by   Darren Vuong ON 3/28/2021] dexamethasone  4 mg Oral Daily    insulin lispro  0-6 Units Subcutaneous TID WC    insulin lispro  0-3 Units Subcutaneous Nightly     PRN Meds: sodium chloride flush, acetaminophen, oxyCODONE **OR** oxyCODONE, HYDROmorphone **OR** HYDROmorphone, naloxone, promethazine **OR** ondansetron, aluminum & magnesium hydroxide-simethicone, bisacodyl, labetalol, glucose, dextrose, glucagon (rDNA), dextrose, potassium chloride, magnesium sulfate      Intake/Output Summary (Last 24 hours) at 3/25/2021 1254  Last data filed at 3/25/2021 1000  Gross per 24 hour   Intake 4745.03 ml Output 5164 ml   Net -418.97 ml       Physical Exam Performed:    BP (!) 156/69   Pulse 78   Temp 98.2 °F (36.8 °C) (Oral)   Resp 25   Ht 5' 2\" (1.575 m)   Wt 230 lb 9.6 oz (104.6 kg)   SpO2 98%   BMI 42.18 kg/m²     General not in apparent distress  HEENT head atraumatic, surgical site C/D/I, clear conjunctiva  Neck supple  Cardiac S1, S2 audible, regular rhythm and rate, no murmur noted  Respiratory bilateral clear to auscultate, no wheeze no rhonchi  Abdomen soft, nontender, bowel sound present  Neuro alert oriented x3, no focal neuro deficit noted  Skin no lower extremity edema  Psych alert, appropriate, calm. Labs:   Recent Labs     03/25/21  0915   WBC 13.1*   HGB 16.0   HCT 48.0        Recent Labs     03/24/21  1418 03/24/21  2355 03/25/21  0915    141 138   K 3.0* 3.4* 3.7    109 105   CO2 22 23 20*   BUN 6* 5* 5*   CREATININE 0.5* 0.7 0.7   CALCIUM 8.6 8.8 9.0     No results for input(s): AST, ALT, BILIDIR, BILITOT, ALKPHOS in the last 72 hours. Recent Labs     03/24/21  1045   INR 1.31*     No results for input(s): Bekah Angela in the last 72 hours. Urinalysis:      Lab Results   Component Value Date    NITRU Negative 06/18/2016    WBCUA 3 06/18/2016    BACTERIA RARE 06/18/2016    RBCUA 2 06/18/2016    BLOODU Negative 06/18/2016    SPECGRAV 1.016 06/18/2016    GLUCOSEU Negative 06/18/2016       Radiology:  CT Head wo Contrast   Final Result      Postsurgical changes of left parieto-occipital craniotomy without evidence of acute abnormality. MRI brain with and without contrast    (Results Pending)           Assessment/Plan:    Active Hospital Problems    Diagnosis Date Noted    Brain mass [G93.89] 03/24/2021     Assessment and plan  #Brain mass status post left parieto-occipital craniotomy for tumor resection on 3/24  On Keppra for seizure prophylaxis, Decadron 4 inflammation. Oxycodone for pain control.   Seizure prophylaxis sign neurosurgery on board. Patient is claustrophobic. Neurosurgery is planning to get MRI tomorrow. #Hypertension  SBP less than 140  Patient was requiring as needed labetalol we will add amlodipine 5 mg daily. Continue as needed labetalol as well. #Diabetes mellitus type 2  On Metformin at home continue to hold. Current insulin sliding scale. DVT Prophylaxis: SCD  Diet: DIET GENERAL;  Code Status: Full Code    PT/OT Eval Status: once cleared by neurosurgery    Dispo - per primary.      This chart was likely completed using voice recognition technology and may contain unintended grammatical , phraseology,and/or punctuation errors      Nisha Rueda MD

## 2021-03-25 NOTE — PROGRESS NOTES
T 98.2m HR 83, /79, RR 26, O2 sat 97% on 2L NC. A&P x4, intermittently confused with year. Currently controlling BP with PRN labetalol. Patient follows commands appropriately. Moderate strength through out, full sensation. GCS 4-5-6. Denies of pain at the moment. Patient appears to be in good spirits. Bed locked, lowest and alarmed. Call light within reach. Will continue to monitor.

## 2021-03-25 NOTE — PROGRESS NOTES
Physical Therapy    Facility/Department: [address] [place] [address] N ICU  Initial Assessment/Treatment    NAME: Mohan Galeano  : 1950  MRN: [id number]    Date of Service: 3/25/2021    Discharge Recommendations: Mohan Galeano scored a 18/24 on the AM-PAC short mobility form. Current research shows that an AM-PAC score of 18 or greater is typically associated with a discharge to the patient's home setting. Based on the patient's AM-PAC score and their current functional mobility deficits, it is recommended that the patient have 2-3 sessions per week of Physical Therapy at d/c to increase the patient's independence. At this time, this patient demonstrates the endurance and safety to discharge home with ** (home vs OP services) and a follow up treatment frequency of 2-3x/wk. Please see assessment section for further patient specific details. If patient discharges prior to next session this note will serve as a discharge summary. Please see below for the latest assessment towards goals. PT Equipment Recommendations  Equipment Needed: No    Assessment   Body structures, Functions, Activity limitations: Decreased functional mobility ; Decreased balance;Decreased endurance  Assessment: 80 yo seen POD 1 from craniotomy for tumor resection. Demo steady gait with walker for short distances in room. Pt too fatigued to walk very far today. Plans for home with 24 hr A from daughter tomorrow. Pt appears close to her baseline & has been cared for by daughter for past month. Will try to practice stairs with pt prior to dc. Treatment Diagnosis: impaired mobility  Prognosis: Good  Decision Making: Medium Complexity  PT Education: Goals;PT Role;Plan of Care  Patient Education: need for 24 hr A at dc, someone to physically walk with her- dtr present & very attentive- plans to provide 24 hr A.   Instructed to take gait belt home for use with pt.  REQUIRES PT FOLLOW UP: Yes  Activity Tolerance  Activity Tolerance: Patient limited by endurance; Patient Tolerated treatment well       Patient Diagnosis(es): The encounter diagnosis was Neoplasm of uncertain behavior of brain ([facility] [address] has a past medical history of Arthritis, Back pain, Blurred vision, Chronic GERD, Congenital absence of one kidney, Diabetes mellitus ([address] Difficulty speaking, Glaucoma, Hyperlipidemia, unspecified, Hypertension, Memory loss, and Prolonged emergence from general anesthesia. has a past surgical history that includes Hysterectomy; Cholecystectomy; eye surgery; knee surgery; cyst removal; Colonoscopy; and craniotomy (Left, 3/24/2021). Restrictions  Restrictions/Precautions  Restrictions/Precautions: Seizure  Position Activity Restriction  Other position/activity restrictions: up as tolerated, ambulate pt  Vision/Hearing  Vision: Impaired  Vision Exceptions: Wears glasses for reading(has glaucoma & has had cataracts removed)  Hearing: Within functional limits     Subjective  General  Chart Reviewed: Yes  Patient assessed for rehabilitation services?: Yes  Additional Pertinent Hx: LEFT PARIETO-OCCIPITAL CRANIOTOMY FOR TUMOR RESECTION 3/24. Family / Caregiver Present: Yes(daughter)  Referring Practitioner: Lord Trinidad MD  Diagnosis: neoplasm in brain  Follows Commands: Within Functional Limits  Subjective  Subjective: Found in bed,agreeable to PT. Reports unable to see out of R eye before surgery. Daughter reports pt was very confused at times before surgery- sometimes not knowing her name or birthdate. Pain Screening  Patient Currently in Pain: No          Orientation  Orientation  Overall Orientation Status: Impaired  Orientation Level: Disoriented to time;Oriented to person;Oriented to place('Its July. \"  \"No August.\"  \"2019\")  Social/Functional History  Social/Functional History  Lives With: Family(daughter & grandkid)  Type of Home: (Condo)  Home Layout: Multi-level  Home Access: Stairs to enter with rails  Entrance Stairs - Number of Steps: 5 + 1 + 1 (the 5 have a rail)  Bathroom Shower/Tub: Tub/Shower unit  Bathroom Toilet: Standard(sink next to toilet)  Bathroom Equipment: Shower chair, Grab bars in [address]: 4 wheeled walker, Cane  ADL Assistance: Needs assistance(1-2 person A recently (dtr & grand-dtr))  Homemaking Assistance: Needs assistance(family does it all)  Ambulation Assistance: Needs assistance(started using walker Feb 25th with 1 person A, doesn't always use walker in house though; 2 people with her when going out places)  Transfer Assistance: Needs assistance  Active : No(not driven in few months)  Leisure & Hobbies: read  Additional Comments: No falls. Daughter has been providing 24hr care since later February. Per dtr- she really has only needed to hold onto her when on stairs & getting in/out shower. Cognition        Objective          AROM RLE (degrees)  RLE AROM: WNL  AROM LLE (degrees)  LLE AROM : WNL  Strength RLE  Strength RLE: WFL  Strength LLE  Strength LLE: WFL     Sensation  Overall Sensation Status: WNL  Bed mobility  Supine to Sit: Minimal assistance  Scooting: Moderate assistance (due to difficulty scooting on this bed & very short & feet not touching floor)  Transfers  Sit to Stand: Contact guard assistance  Stand to sit: Contact guard assistance  Ambulation  Ambulation?: Yes  Ambulation 1  Surface: level tile  Device: Rolling Walker  Assistance: Contact guard assistance  Quality of Gait: steady gait, no LOB  Distance: 10' x 2, 30'  Comments: Declined further gait due to fatigue. Stairs/Curb  Stairs?: No     Balance  Sitting - Static: Good(EOB sitting x 5-6 min SBA.)    Treatment included gait/transfer training, pt education. O2 sats 93% on RA during ambulation. Replaced on 2L after walking- RN aware.     Plan   Plan  Times per week: 5-7  Current Treatment Recommendations: Balance Training, Functional Mobility Training, Transfer Training, Gait Training, Stair training, Safety Education & Training  Safety Devices  Type of devices: Call light within reach, Chair alarm in place, Nurse notified, Left in chair                                                      AM-PAC Score  AM-PAC Inpatient Mobility Raw Score : 18 (03/25/21 1203)  AM-PAC Inpatient T-Scale Score : 43.63 (03/25/21 1203)  Mobility Inpatient CMS 0-100% Score: 46.58 (03/25/21 1203)  Mobility Inpatient CMS G-Code Modifier : CK (03/25/21 1203)          Goals  Short term goals  Time Frame for Short term goals: dc  Short term goal 1: Bed Mobility sBA  Short term goal 2: Transfers SBA  Short term goal 3: ambulate 100' with RW SBA  Short term goal 4: up/down flight of stairs with rail CGA  Patient Goals   Patient goals : home tomorrow with daughter       Therapy Time   Individual Concurrent Group Co-treatment   Time In 1053         Time Out 1146         Minutes 53           Timed Code Treatment Minutes:   40    Total Treatment Minutes:  [address], [address] [place] [address]

## 2021-03-25 NOTE — CARE COORDINATION
Case Management Assessment           Initial Evaluation                Date / Time of Evaluation: 3/25/2021 12:25 PM                 Assessment Completed by: Ledy Nova     Patient is from home with family and will return there at d/c. Her daughter can drive her home when ready. They did not have services prior and deny need for home care at d/c. She has a walker and shower chair at home and they have no further DME needs. There are steps to get in but family is able to assist her up the steps as well. Patient Name: Dane Benjamin     YOB: 1950  Diagnosis: Neoplasm of uncertain behavior of brain [facility]) [D43.2]  Brain mass [G93.89]     Date / Time: 3/24/2021  9:50 AM    Patient Admission Status: Inpatient    If patient is discharged prior to next notation, then this note serves as note for discharge by case management. Current PCP: Kristian Mixon MD  Clinic Patient: No    Chart Reviewed: Yes  Patient/ Family Interviewed: Yes    Initial assessment completed at bedside with: patient and daughter    Hospitalization in the last 30 days: No    Emergency Contacts:  Extended Emergency Contact Information  Primary Emergency Contact: Alfa Bach  Address: [address], [address] [place] [address] Phone: [phone]  Relation: Child    Advance Directives:   Code Status: [address]: No  Agent: NA  Contact Number: NA    Financial  Payor: Efrem Dread / Plan: MEDICARE PART A AND B / Product Type: *No Product type* /     Pre-cert required for SNF: No    Pharmacy    [facility] [address] [facility], [address] [phone] [employer] [phone]   Jessica Vázquez [address]  Phone: [phone] Fax: [phone]      Potential assistance Purchasing Medications:    Does Patient want to participate in local refill/ meds to beds program?:      Meds To Beds General Rules:  1.  Can ONLY be done Monday- Friday between 8:30am-5pm  2. Prescription(s) must be in pharmacy by 3pm to be filled same day  3. Copy of patient's insurance/ prescription drug card and patient face sheet must be sent along with the prescription(s)  4. Cost of Rx cannot be added to hospital bill. If financial assistance is needed, please contact unit  or ;  or  CANNOT provide pharmacy voucher for patients co-pays  5. Patients can then  the prescription on their way out of the hospital at discharge, or pharmacy can deliver to the bedside if staff is available. (payment due at time of pick-up or delivery - cash, check, or card accepted)     Able to afford home medications/ co-pay costs: Yes    ADLS  Support Systems: Family Members    PT AM-PAC: 18 /24  OT AM-PAC: 18 /24    [place]: [place] jes[place]  Steps: 5 + 1 + 1 to enter    Plans to RETURN to current housing: Yes  Barriers to RETURNING to current housing: none noted    Durable Medical Equipment  DME Provider:   Equipment: walker and bath bench      DISCHARGE PLAN:  Disposition: Home- No Services Needed    Transportation PLAN for discharge: family     Factors facilitating achievement of predicted outcomes: Family support, Cooperative, Pleasant and Sense of humor    Barriers to discharge: need to wean off O2    Additional Case Management Notes: NA    The Plan for Transition of Care is related to the following treatment goals of Neoplasm of uncertain behavior of brain ([facility] [address] [D43.2]  Brain mass [G93.89]    The Patient and/or patient representative Js Fletcher and her family were provided with a choice of provider and agrees with the discharge plan Yes    Freedom of choice list was provided with basic dialogue that supports the patient's individualized plan of care/goals and shares the quality data associated with the providers.  Not Indicated    Care Transition patient: No    Ashlie Foster RN  [facility]  Case

## 2021-03-25 NOTE — PROGRESS NOTES
NEUROSURGERY POST-OP PROGRESS NOTE    Patient Name: Keren Huizar YOB: 1950   Sex: Female Age: 79 yrs     Medical Record Number: [id number] [place] Number: [de-identified]   Room Number: 5006/7265-16 Hospital Day: Hospital Day: 2     Interval History:  Post-operative Day# 1 s/p Procedure(s) (LRB):  LEFT PARIETO-OCCIPITAL CRANIOTOMY FOR TUMOR RESECTION (Left)    Subjective: Pt denies headache, nausea or incisional pain at this time. C/o of arm pain from ismael. Objective:    VITAL SIGNS   BP (!) 148/108   Pulse 85   Temp 98 °F (36.7 °C) (Oral)   Resp 21   Ht 5' 2\" (1.575 m)   Wt 230 lb 9.6 oz (104.6 kg)   SpO2 98%   BMI 42.18 kg/m²    Height Height: 5' 2\" (157.5 cm)   Weight Weight: 230 lb 9.6 oz (104.6 kg)        Allergies Allergies   Allergen Reactions    Ibuprofen      GI issues    Naprosyn [Naproxen]     Codeine Nausea And Vomiting and Other (See Comments)     ABDOMINAL CRAMPING      NPO Status DIET GENERAL;   Isolation No active isolations     LABS   Basic Metabolic Profile Recent Labs     03/24/21  1418 03/24/21  2355    141    109   CO2 22 23   BUN 6* 5*   CREATININE 0.5* 0.7   GLUCOSE 124* 145*   MG 1.30* 1.90      Complete Blood Count No results for input(s): WBC, RBC, HEMOGLOBIN in the last 72 hours.     Invalid input(s): HEMATOCRIT   Coagulation Studies Recent Labs     03/24/21  1045   INR 1.31*        MEDICATIONS   Inpatient Medications     ferrous sulfate, 325 mg, Oral, BID    latanoprost, 1 drop, Both Eyes, Nightly    levETIRAcetam, 500 mg, Oral, BID    pantoprazole, 40 mg, Oral, QAM AC    atorvastatin, 10 mg, Oral, Nightly    sodium chloride flush, 10 mL, Intravenous, 2 times per day    ceFAZolin (ANCEF) IVPB, 2,000 mg, Intravenous, Q8H    polyethylene glycol, 17 g, Oral, Daily    sennosides-docusate sodium, 1 tablet, Oral, BID    heparin (porcine) PF, 5,000 Units, Subcutaneous, Q8H    dexamethasone, 4 mg, Oral, 4 times per day **FOLLOWED BY** dexamethasone, 4 mg, Oral, 3 times per day **FOLLOWED BY** [START ON 3/26/2021] dexamethasone, 4 mg, Oral, 2 times per day **FOLLOWED BY** [START ON 3/28/2021] dexamethasone, 4 mg, Oral, Daily    insulin lispro, 0-6 Units, Subcutaneous, TID WC    insulin lispro, 0-3 Units, Subcutaneous, Nightly   Infusions    dextrose        Antibiotics   Recent Abx Admin                   ceFAZolin (ANCEF) 2000 mg in dextrose 3 % 50 mL IVPB (duplex) (mg) 2,000 mg New Bag 03/25/21 0511     2,000 mg New Bag 03/24/21 2211    ceFAZolin (ANCEF) 2000 mg in dextrose 3 % 50 mL IVPB (duplex) (mg) 2,000 mg Given 03/24/21 1230    ceFAZolin (ANCEF) 1,000 mg in sodium chloride 0.9 % 1,000 mL (mL) 1,000 mL Given 03/24/21 1129                 Neurologic Exam:  Mental status: awake and alert and oriented x4    Cranial Nerves:  II: Visual acuity not tested, visual fields full, denies new visual changes / diplopia  III, IV, VI: PERRL, 3 mm bilaterally, EOMI, no nystagmus noted  V: Facial sensation intact bilaterally to touch  VII: Face symmetric  VIII: Hearing intact bilaterally to spoken voice  IX: Palate movement equal bilaterally  XI: Shoulder shrug equal bilaterally  XII: Tongue midline      Musculoskeletal:   Gait: Not tested   Tone: normal  Sensory: intact to all extremities  Motor strength:    Right  Left    Right  Left    Deltoid  5 5  Hip Flex  5 5   Biceps  5 5  Knee Extensors  5 5   Triceps  5 5  Knee Flexors  5 5   Wrist Ext  5 5  Ankle Dorsiflex. 5 5   Wrist Flex  5 5  Ankle Plantarflex. 5 5   Handgrip  5 5  Ext Boo Longus  5 5   Thumb Ext  5 5         Incision: intact, clean and dry      Respiratory:  Unlabored respiratory pattern    Abdomen:   Soft, ND       Cardiovascular:  Warm, well perfused    Assessment   Patient is a 80 yo F s/p Procedure(s) (LRB):  LEFT PARIETO-OCCIPITAL CRANIOTOMY FOR TUMOR RESECTION (Left) per Dr. Fred Brooke:  1. Neurologic exam frequency:q4  2. Mobility:PT/OT eval  3. Steroids: as ordered  4.  Seizure

## 2021-03-25 NOTE — PLAN OF CARE
Problem: Falls - Risk of:  Goal: Will remain free from falls  Description: Will remain free from falls  3/25/2021 1425 by John Delvalle RN  Outcome: Ongoing    Problem: Pain:  Goal: Control of acute pain  Description: Control of acute pain  Outcome: Ongoing     Problem: Pain:  Goal: Pain level will decrease  Description: Pain level will decrease  Outcome: Ongoing

## 2021-03-25 NOTE — PROGRESS NOTES
Occupational Therapy   Occupational Therapy Initial Assessment & Tx  Date: 3/25/2021   Patient Name: Monica Cedeno  MRN: [id number]     : 1950    Date of Service: 3/25/2021    Discharge Recommendations: Monica Cedeno scored a 18/24 on the AM-PAC ADL Inpatient form. Current research shows that an AM-PAC score of 18 or greater is typically associated with a discharge to the patient's home setting. Based on the patient's AM-PAC score, and their current ADL deficits, it is recommended that the patient have 2-3 sessions per week of Occupational Therapy at d/c to increase the patient's independence. At this time, this patient demonstrates the endurance and safety to discharge home with home services and a follow up treatment frequency of 2-3x/wk. Please see assessment section for further patient specific details. If patient discharges prior to next session this note will serve as a discharge summary. Please see below for the latest assessment towards goals. HOME HEALTH CARE: LEVEL 1 STANDARD    - Initial home health evaluation to occur within 24-48 hours, in patient home   - Therapy to evaluate with goal of regaining prior level of functioning   - Therapy to evaluate if patient has [address] needs for personal care        OT Equipment Recommendations  Equipment Needed: No(pt owns all needed OT DME)    Assessment   Performance deficits / Impairments: Decreased functional mobility ; Decreased ADL status; Decreased strength  Assessment: Pt seen POD#1 for craniotomy & tumor resection, moving well at [facility] for transfers and functional mobility, with decreased endurance. Limited ADL assessment this session. Anticipate pt will make good progress with additional time to recover. Pt has good support from daughter & family at home, who was in room and seemed very active in pt's care. Recommend 24 hr A upon DC for all ADLs & transfers. She may benefit from [facility] to maximize functional independence. Cont. to progress per POC. Treatment Diagnosis: Impaired ADLs & transfers  Prognosis: Good  Decision Making: Medium Complexity  OT Education: OT Role;Plan of Care;Family Education;Equipment;Transfer Training  Patient Education: pt & daughter verb understanding to all  REQUIRES OT FOLLOW UP: Yes  Activity Tolerance  Activity Tolerance: Patient Tolerated treatment well;Patient limited by fatigue  Activity Tolerance: Pt wearing 1.5 L on arrival (not at baseline), able to tolerate removing oxygen for therapy and SpO2 remained 93% or above throughout; all other VSS. Fatigued by end of session, able to only walk to doorway and back (anticipate further distance when pt feeling stronger)  Safety Devices  Safety Devices in place: Yes  Type of devices: Call light within reach; Chair alarm in place; Left in chair;Nurse notified(daughter in room end of session)           Patient Diagnosis(es): The encounter diagnosis was Neoplasm of uncertain behavior of brain ([facility] [address] has a past medical history of Arthritis, Back pain, Blurred vision, Chronic GERD, Congenital absence of one kidney, Diabetes mellitus ([facility] [address] Difficulty speaking, Glaucoma, Hyperlipidemia, unspecified, Hypertension, Memory loss, and Prolonged emergence from general anesthesia. has a past surgical history that includes Hysterectomy; Cholecystectomy; eye surgery; knee surgery; cyst removal; Colonoscopy; and craniotomy (Left, 3/24/2021). Treatment Diagnosis: Impaired ADLs & transfers      Restrictions  Restrictions/Precautions  Restrictions/Precautions: Seizure  Position Activity Restriction  Other position/activity restrictions: up as tolerated, ambulate pt    Subjective   General  Chart Reviewed: Yes  Patient assessed for rehabilitation services?: Yes  Additional Pertinent Hx: Hx = HLD, HTN, DM, congenital absence of 1 kidney  Family / Caregiver Present: Yes(daughter in room full session)  Referring Practitioner:  Tristin Lieberman MD  Diagnosis: 3/24 to OR for scheduled LEFT PARIETO-OCCIPITAL CRANIOTOMY FOR TUMOR RESECTION (2/2 hx of word finding difficulty, memory impairment and right sided visual loss in the setting of left occipital mass) - no complications noted. Subjective  Subjective: Pt in bed on arrival, pleasant and agreeable to OT. Patient Currently in Pain: No  Social/Functional History  Social/Functional History  Lives With: Family(daughter & grandkid)  Type of Home: (Condo)  Home Layout: Multi-level  Home Access: Stairs to enter with rails  Entrance Stairs - Number of Steps: 5 + 1 + 1 (the 5 have a rail)  Bathroom Shower/Tub: Tub/Shower unit  Bathroom Toilet: Standard(sink next to toilet)  Bathroom Equipment: Shower chair, Grab bars in [address]: 4 wheeled walker, Cane  ADL Assistance: Needs assistance(1-2 person A recently (dtr & grand-dtr))  Homemaking Assistance: Needs assistance(family does it all)  Ambulation Assistance: Needs assistance(started using walker Feb 25th with 1 person A, doesn't always use walker in house though; 2 people with her when going out places)  Transfer Assistance: Needs assistance  Active : No(not driven in few months)  Leisure & Hobbies: read  Additional Comments: No falls. Daughter has been providing 24hr care since later February. Per dtr- she really has only needed to hold onto her when on stairs & getting in/out shower. (Simultaneous filing.  User may not have seen previous data.)       Objective        Orientation  Overall Orientation Status: Within Functional Limits     Balance  Sitting Balance: Stand by assistance(EOB ~3 min)  Standing Balance: Contact guard assistance  Standing Balance  Time: up to 2.5 min  Activity: room mobility  Functional Mobility  Functional - Mobility Device: Rolling Walker  Activity: To/from bathroom(& short lap to doorway & back)  Functional Mobility Comments: steady, no LOB  Toilet Transfers  Toilet - Technique: Ambulating  Equipment Used: Standard toilet([place] bar)  Toilet Transfer: Contact guard assistance  ADL  Toileting: (declined need for BM, sosa cath in place)        Bed mobility  Supine to Sit: Minimal assistance(scooting forward)  Scooting: Moderate assistance  Transfers  Sit to stand: Contact guard assistance  Stand to sit: Contact guard assistance  Transfer Comments: benefits from VC/TC for safe hand placement  Vision - Basic Assessment  Prior Vision: Wears glasses only for reading  Visual History: Corrective eye surgery; Cataracts;Glaucoma  Patient Visual Report: (previously had difficulty w/ R eye when reading, says it has improved since surgery)  Visual Field Cut: (minor field cut for peripheral vision: R eye is better than L eye)  Oculo Motor Control: WNL  Vision Comments: some difficulty w/ distance reading, WFL for mobility - cont. to assess as needed  Cognition  Overall Cognitive Status: [facility]  Cognition Comment: req.  A for problem-solving; daughter in room verifying PLOF (questionable memory); some difficulty word-finding, pleasant & fair insight  Perception  Overall Perceptual Status: [facility]     Sensation  Overall Sensation Status: WNL        LUE AROM (degrees)  LUE AROM : WFL  RUE AROM (degrees)  RUE AROM : WFL  LUE Strength  Gross LUE Strength: WFL(5/5)  RUE Strength  Gross RUE Strength: WFL (4/5)     Hand Dominance  Hand Dominance: Right             Plan   Plan  Times per week: 5-7x  Times per day: Daily  Specific instructions for Next Treatment: continue to progress ADLs; progress balance; continue to re-assess functional vision & cognition    G-Code     OutComes Score                                                  AM-PAC Score        AM-[facility] Inpatient Daily Activity Raw Score: 18 (03/25/21 1155)  AM-PAC Inpatient ADL T-Scale Score : 38.66 (03/25/21 1155)  ADL Inpatient CMS 0-100% Score: 46.65 (03/25/21 1155)  ADL Inpatient CMS G-Code Modifier : CK (03/25/21 1155)    Goals  Short term goals  Time Frame for Short term goals: discharge  Short term goal 1: Pt will complete bed mobility at SBA level  Short term goal 2: Pt will don brief at [address] level  Short term goal 3: Pt will complete toileting at [address] level  Patient Goals   Patient goals : go home       Therapy Time   Individual Concurrent Group Co-treatment   Time In 1053         Time Out 1146         Minutes 53              Timed Code Treatment Minutes:  38     Total Treatment Minutes: [address], s/OT  Jennifer Abreu OTR/L #[id number]  Therapist was present, directed the patient's care, made skilled judgement, and was responsible for assessment and treatment of the patient.

## 2021-03-25 NOTE — CONSULTS
Hospital Medicine  Consult History & Physical        Chief Complaint:  Neoplasm of brain    Date of Service: Pt seen/examined in consultation on 03/24/2021    History Of Present Illness:      79 y.o. female who we are asked to see/evaluate by Óscar Cisse MD for medical management of comorbidities    Patient reported 2 week onset of memory loss waxing and waning noted by daughter no known alleviating or exacerbating factor no association of diaphoresis, night sweats, fever, Nausea, vomiting, diarrhea. Reported patient went to get the eye for vision loss and was diagnosed with right homonous hemianopia. Patient underwent MRI that showed intracranial nodular and ring enhances mass lesions. Patient came in for neurosurgical evaluation and craniotomy for tumor resection. Post-op no complications, has no current complains. Pain controlled well, with no new neuro deficits. Past Medical History:        Diagnosis Date    Arthritis     Back pain     Blurred vision     Chronic GERD     Congenital absence of one kidney     Diabetes mellitus ([facility] [address]     TYPE 2    Difficulty speaking     Glaucoma     Hyperlipidemia, unspecified     Hypertension     Memory loss     Prolonged emergence from general anesthesia        Past Surgical History:        Procedure Laterality Date    CHOLECYSTECTOMY      COLONOSCOPY      CYST REMOVAL      BREAST    EYE SURGERY      cataract removal    HYSTERECTOMY      KNEE SURGERY         Medications Prior to Admission:    Prior to Admission medications    Medication Sig Start Date End Date Taking?  Authorizing Provider   dexamethasone (DECADRON) 2 MG tablet Take 2 mg by mouth 2 times daily (with meals)   Yes Historical Provider, MD   levETIRAcetam (KEPPRA) 500 MG tablet Take 500 mg by mouth 2 times daily   Yes Historical Provider, MD   pantoprazole (PROTONIX) 40 MG tablet nightly  1/22/18  Yes Historical Provider, MD   ferrous sulfate 325 (65 Fe) MG tablet Take 325 mg by mouth 2 times daily    Yes Historical Provider, MD   latanoprost (XALATAN) 0.005 % ophthalmic solution 1 drop nightly. Yes Historical Provider, MD   metFORMIN (GLUCOPHAGE) 500 MG tablet Take 500 mg by mouth daily (with breakfast)    Yes Historical Provider, MD   simvastatin (ZOCOR) 20 MG tablet Take 20 mg by mouth nightly. Yes Historical Provider, MD   aspirin 81 MG chewable tablet Take 81 mg by mouth daily. Historical Provider, MD       Allergies:  Ibuprofen, Naprosyn [naproxen], and Codeine    Social History:          TOBACCO:   reports that she quit smoking about 18 years ago. She has never used smokeless tobacco.  ETOH:   reports no history of alcohol use. Family History:             Problem Relation Age of Onset    Cancer Mother     High Blood Pressure Mother     High Cholesterol Mother     Arthritis Mother     Glaucoma Mother     Cancer Father     High Blood Pressure Father     High Cholesterol Father     Diabetes Father     Arthritis Father     High Blood Pressure Brother     Migraines Brother     Glaucoma Maternal Grandmother        REVIEW OF SYSTEMS:   Pertinent positives as noted in the HPI. All other systems reviewed and negative. PHYSICAL EXAM PERFORMED:  BP (!) 178/111   Pulse 107   Temp 97.5 °F (36.4 °C) (Temporal)   Resp 25   Ht 5' 2\" (1.575 m)   Wt 230 lb 9.6 oz (104.6 kg)   SpO2 92%   BMI 42.18 kg/m²   General appearance: No apparent distress, appears stated age and cooperative. HEENT: Normal cephalic, atraumatic without obvious deformity. Pupils equal, round, and reactive to light. Extra ocular muscles intact. Conjunctivae/corneas clear. Neck: Supple, with full range of motion. No jugular venous distention. Trachea midline. Respiratory:  Normal respiratory effort. Clear to auscultation, bilaterally without wheezes  Cardiovascular: Regular rate and rhythm with normal S1/S2 without murmurs, rubs or gallops.   Abdomen: Soft, non-tender, non-distended with normal bowel sounds. Musculoskeletal:No clubbing, cyanosis or edema bilaterally. Skin: Skin color, texture, turgor normal.  No rashes or lesions, clean incision. Neurologic:  Neurovascularly intact without new focal sensory/motor deficits. Psychiatric: Alert and oriented, thought content appropriate, normal insight  Capillary Refill: Brisk,< 3 seconds   Peripheral Pulses: +2 palpable, equal bilaterally     Labs:     No results for input(s): WBC, HGB, HCT, PLT in the last 72 hours. Recent Labs     03/24/21  1418      K 3.0*      CO2 22   BUN 6*   CREATININE 0.5*   CALCIUM 8.6     No results for input(s): AST, ALT, BILIDIR, BILITOT, ALKPHOS in the last 72 hours. Recent Labs     03/24/21  1045   INR 1.31*     No results for input(s): Lewanda Bene in the last 72 hours. Urinalysis:    Lab Results   Component Value Date    NITRU Negative 06/18/2016    WBCUA 3 06/18/2016    BACTERIA RARE 06/18/2016    RBCUA 2 06/18/2016    BLOODU Negative 06/18/2016    SPECGRAV 1.016 06/18/2016    GLUCOSEU Negative 06/18/2016       Radiology: I have reviewed the radiology reports with the following interpretation:     CT Head wo Contrast   Final Result      Postsurgical changes of left parieto-occipital craniotomy without evidence of acute abnormality. MRI brain with and without contrast    (Results Pending)          EKG:  I have reviewed the EKG with the following interpretation:    NSR with LAFP qtc 468      ASSESSMENT:    Active Hospital Problems    Diagnosis Date Noted    Brain mass [G93.89] 03/24/2021       PLAN:  Brain mass s/p left parieto-occipital craniotomy for tumor resection day 0:  Decadron, cefazolin, keppra  Oxycodone for pain  Seizure precaution  Neurosurgery primary    Hypokalemia: replacement protocol started  Mg level ordered with mg replacement protocol in incase it is low. HTN: goal BP <140 post-op, added labetalol PRN  T2DM: on metformin continue to hold.  ISS to keep checking bg  HLP: continue home med  Jessy Hockey: continue home med  Obesity: Complicating assessment and treatment. Placing patient at risk for multiple co-morbidities as well as early death and contributing to the patient's presentation.  Education, and counseling    DVT Prophylaxis: held  Diet: DIET GENERAL;  Code Status: Full Code    PT/OT Eval Status: Active and ongoing    Thank you for the consultation, will follow up as needed    Electronically signed by Miguelina Finley DO on 3/24/21 at 8:47 PM EDT

## 2021-03-25 NOTE — PROGRESS NOTES
4 Eyes Admission Assessment     I agree as the admission nurse that 2 RN's have performed a thorough Head to Toe Skin Assessment on the patient. ALL assessment sites listed below have been assessed on admission. Areas assessed by both nurses:   [x]   Head, Face, and Ears   [x]   Shoulders, Back, and Chest  [x]   Arms, Elbows, and Hands   [x]   Coccyx, Sacrum, and Ischium  [x]   Legs, Feet, and Heels        Does the Patient have Skin Breakdown?   No         Addi Prevention initiated:  No   Wound Care Orders initiated:  No      [facility] nurse consulted for Pressure Injury (Stage 3,4, Unstageable, DTI, NWPT, and Complex wounds) or Addi score 18 or lower:  No      Nurse 1 eSignature: Electronically signed by Kristian Morales RN on 3/25/21 at 4:22 PM EDT    **SHARE this note so that the co-signing nurse is able to place an eSignature**    Nurse 2 eSignature: Electronically signed by Clarisse Rodriguez RN on 3/25/21 at 5:20 PM EDT

## 2021-03-26 VITALS
SYSTOLIC BLOOD PRESSURE: 126 MMHG | OXYGEN SATURATION: 96 % | BODY MASS INDEX: 43.21 KG/M2 | TEMPERATURE: 97.9 F | WEIGHT: 234.8 LBS | HEIGHT: 62 IN | HEART RATE: 72 BPM | RESPIRATION RATE: 16 BRPM | DIASTOLIC BLOOD PRESSURE: 64 MMHG

## 2021-03-26 PROBLEM — Z98.890 S/P CRANIOTOMY: Status: ACTIVE | Noted: 2021-03-26

## 2021-03-26 LAB
ESTIMATED AVERAGE GLUCOSE: 122.6 MG/DL
GLUCOSE BLD-MCNC: 128 MG/DL (ref 70–99)
HBA1C MFR BLD: 5.9 %
PERFORMED ON: ABNORMAL

## 2021-03-26 PROCEDURE — 2580000003 HC RX 258: Performed by: NEUROLOGICAL SURGERY

## 2021-03-26 PROCEDURE — 97530 THERAPEUTIC ACTIVITIES: CPT

## 2021-03-26 PROCEDURE — 97110 THERAPEUTIC EXERCISES: CPT

## 2021-03-26 PROCEDURE — 97535 SELF CARE MNGMENT TRAINING: CPT

## 2021-03-26 PROCEDURE — 6370000000 HC RX 637 (ALT 250 FOR IP): Performed by: INTERNAL MEDICINE

## 2021-03-26 PROCEDURE — 97116 GAIT TRAINING THERAPY: CPT

## 2021-03-26 PROCEDURE — 6360000002 HC RX W HCPCS: Performed by: NEUROLOGICAL SURGERY

## 2021-03-26 PROCEDURE — 6370000000 HC RX 637 (ALT 250 FOR IP): Performed by: NEUROLOGICAL SURGERY

## 2021-03-26 RX ORDER — SENNA AND DOCUSATE SODIUM 50; 8.6 MG/1; MG/1
1 TABLET, FILM COATED ORAL 2 TIMES DAILY
COMMUNITY
Start: 2021-03-26

## 2021-03-26 RX ORDER — DEXAMETHASONE 4 MG/1
4 TABLET ORAL EVERY 8 HOURS SCHEDULED
Qty: 6 TABLET | Refills: 0 | Status: SHIPPED | OUTPATIENT
Start: 2021-03-26 | End: 2021-03-27

## 2021-03-26 RX ORDER — AMLODIPINE BESYLATE 5 MG/1
5 TABLET ORAL DAILY
Qty: 30 TABLET | Refills: 3 | Status: SHIPPED | OUTPATIENT
Start: 2021-03-27

## 2021-03-26 RX ORDER — POLYETHYLENE GLYCOL 3350 17 G/17G
17 POWDER, FOR SOLUTION ORAL DAILY
Qty: 527 G | Refills: 1 | COMMUNITY
Start: 2021-03-27 | End: 2021-04-26

## 2021-03-26 RX ORDER — OXYCODONE HYDROCHLORIDE 5 MG/1
5 TABLET ORAL EVERY 6 HOURS PRN
Qty: 28 TABLET | Refills: 0 | Status: SHIPPED | OUTPATIENT
Start: 2021-03-26 | End: 2021-04-02

## 2021-03-26 RX ADMIN — AMLODIPINE BESYLATE 5 MG: 5 TABLET ORAL at 07:58

## 2021-03-26 RX ADMIN — LEVETIRACETAM 500 MG: 500 TABLET ORAL at 07:58

## 2021-03-26 RX ADMIN — POLYETHYLENE GLYCOL 3350 17 G: 17 POWDER, FOR SOLUTION ORAL at 07:58

## 2021-03-26 RX ADMIN — DEXAMETHASONE 4 MG: 4 TABLET ORAL at 06:06

## 2021-03-26 RX ADMIN — DOCUSATE SODIUM 50 MG AND SENNOSIDES 8.6 MG 1 TABLET: 8.6; 5 TABLET, FILM COATED ORAL at 07:57

## 2021-03-26 RX ADMIN — FERROUS SULFATE TAB 325 MG (65 MG ELEMENTAL FE) 325 MG: 325 (65 FE) TAB at 07:58

## 2021-03-26 RX ADMIN — DEXAMETHASONE 4 MG: 4 TABLET ORAL at 13:04

## 2021-03-26 RX ADMIN — CEFAZOLIN SODIUM 2000 MG: 2 SOLUTION INTRAVENOUS at 04:33

## 2021-03-26 RX ADMIN — HEPARIN SODIUM 5000 UNITS: 5000 INJECTION INTRAVENOUS; SUBCUTANEOUS at 13:05

## 2021-03-26 RX ADMIN — PANTOPRAZOLE SODIUM 40 MG: 40 TABLET, DELAYED RELEASE ORAL at 06:06

## 2021-03-26 RX ADMIN — HEPARIN SODIUM 5000 UNITS: 5000 INJECTION INTRAVENOUS; SUBCUTANEOUS at 06:06

## 2021-03-26 RX ADMIN — Medication 10 ML: at 10:26

## 2021-03-26 ASSESSMENT — PAIN SCALES - GENERAL: PAINLEVEL_OUTOF10: 0

## 2021-03-26 NOTE — PROGRESS NOTES
Occupational Therapy  Facility/Department: [facility] 5T ORTHO/NEURO  Daily Treatment Note  NAME: Gosia Bernard  : 1950  MRN: [id number]    Date of Service: 3/26/2021    Discharge Recommendations:    Gosia Bernard scored a 18/24 on the AM-PAC ADL Inpatient form. Current research shows that an AM-PAC score of 18 or greater is typically associated with a discharge to the patient's home setting. Based on the patient's AM-PAC score, and their current ADL deficits, it is recommended that the patient have 2-3 sessions per week of Occupational Therapy at d/c to increase the patient's independence. At this time, this patient demonstrates the endurance and safety to discharge home with 24hr assist and HHOT and a follow up treatment frequency of 2-3x/wk. Please see assessment section for further patient specific details. If patient discharges prior to next session this note will serve as a discharge summary. Please see below for the latest assessment towards goals. HOME HEALTH CARE: LEVEL 1 STANDARD    - Initial home health evaluation to occur within 24-48 hours, in patient home   - Therapy to evaluate with goal of regaining prior level of functioning   - Therapy to evaluate if patient has [address] needs for personal care    Assessment   Assessment: Pt eager for discharge this AM. Functiona mobility demonstrated to and from bathroom with CGA and good endurance. Pt with all needs in place at home and good family support. Pt would benefit from  24hr assist as needed upon return home with [address] [place] [address]. Continue OT per POC    OT Education: OT Role;Plan of Care;Transfer Training  Patient Education: verb understanding  Activity Tolerance  Activity Tolerance: Patient Tolerated treatment well         Patient Diagnosis(es): The primary encounter diagnosis was S/P craniotomy. A diagnosis of Neoplasm of uncertain behavior of brain [facility]) was also pertinent to this visit.       has a past medical history of Arthritis, Back pain, Blurred vision, Chronic GERD, Congenital absence of one kidney, Diabetes mellitus ([address] Difficulty speaking, Glaucoma, Hyperlipidemia, unspecified, Hypertension, Memory loss, and Prolonged emergence from general anesthesia. has a past surgical history that includes Hysterectomy; Cholecystectomy; eye surgery; knee surgery; cyst removal; Colonoscopy; and craniotomy (Left, 3/24/2021). Restrictions  Restrictions/Precautions  Restrictions/Precautions: Seizure  Position Activity Restriction  Other position/activity restrictions: up as tolerated, ambulate pt     Diagnosis: 3/24 to OR for scheduled LEFT PARIETO-OCCIPITAL CRANIOTOMY FOR TUMOR RESECTION (2/2 hx of word finding difficulty, memory impairment and right sided visual loss in the setting of left occipital mass) - no complications noted. Treatment Diagnosis: Impaired ADLs & transfers    Subjective:   Pt met supine in bed and agreeable to OT treatment    Pain:   Denies    Objective:    Cognition/Orientation:  Oriented x4    Bed mobility   Supine to sit: SBA with HOB flat   Scooting: SBA    Functional Mobility   Sit to Stand: CGA from EOB and room chair  Stand to Sit: CGA to recliner chair  Bed to Chair Transfer:  CGA with VCs for hand placement  Commode Transfer: declined Pt stating she had GBs in place at home  Other:  Functionalmobility to and from bathroom and around room with RW and CGA. ADLs   Grooming: CGA washing hands and face in stance at sink  UB dressing: Pt declining dressing task but stating she perfers her daughter help her. Pt educated on safe home set up for dressing    UE Exercises   2 set 5 reps chair push ups  10 reps shoulder flex/ext  10 reps forward reaches    Safety Devices in Place:  Pt left seated in recliner chair with alarm on and call light in reach.          Plan  If pt discharges prior to next treatment, this note will serve as discharge summary  Plan  Times per week: 5-7x  Times per day: Daily  Specific instructions for Next Treatment: continue to progress ADLs; progress balance; continue to re-assess functional vision & cognition           AM-PAC Score        AM-PAC Inpatient Daily Activity Raw Score: 18 (03/26/21 1604)  AM-PAC Inpatient ADL T-Scale Score : 38.66 (03/26/21 1604)  ADL Inpatient CMS 0-100% Score: 46.65 (03/26/21 1604)  ADL Inpatient CMS G-Code Modifier : CK (03/26/21 1604)    Goals (as determined and assessed by primary OT)  Short term goals  Time Frame for Short term goals: discharge  Short term goal 1: Pt will complete bed mobility at SBA level - ongoing  Short term goal 2: Pt will don brief at [address] level - ongoing  Short term goal 3: Pt will complete toileting at [address] level - goal met  Patient Goals   Patient goals : go home       Therapy Time   Individual Concurrent Group Co-treatment   Time In 0750         Time Out 0815         Minutes 25         Timed Code Treatment Minutes: [address] [facility] aniyaa. L

## 2021-03-26 NOTE — PROGRESS NOTES
BP elevated , this am , pt given BP pill and repeat BP decreased to acceptable level , iv labetalol not needed , pt up in chair, daughter at bedside, possible discharge today  ,

## 2021-03-26 NOTE — DISCHARGE INSTR - COC
Continuity of Care Form    Patient Name: Keren Huizar   :  1950  MRN:  [id number]    Admit date:  3/24/2021  Discharge date:  ***    Code Status Order: Full Code   Advance Directives:   Advance Care Flowsheet Documentation     Date/Time Healthcare Directive Type of Healthcare Directive Copy in [address] Agent's Name Healthcare Agent's Phone Number    21 1054  No, patient does not have an advance directive for healthcare treatment -- -- -- -- --    21 1532  No, patient does not have an advance directive for healthcare treatment -- -- -- -- --          Admitting Physician: Nuno Sommers MD  PCP: Courtney Mehta MD    Discharging Nurse: [facility] Unit/Room#: 5237/0090-90  Discharging Unit Phone Number: ***    Emergency Contact:   Extended Emergency Contact Information  Primary Emergency Contact: Jasmina Ron  Address: [address] [place] [address], [address] [place] [address] Phone: [phone]  Relation: Child    Past Surgical History:  Past Surgical History:   Procedure Laterality Date    CHOLECYSTECTOMY      COLONOSCOPY      CRANIOTOMY Left 3/24/2021    LEFT PARIETO-OCCIPITAL CRANIOTOMY FOR TUMOR RESECTION performed by Nuno Sommers MD at [address] [place] [address] Dr      cataract removal    HYSTERECTOMY      KNEE SURGERY         Immunization History: There is no immunization history on file for this patient.     Active Problems:  Patient Active Problem List   Diagnosis Code    Chest pain R07.9    SELF (dyspnea on exertion) R06.00    Acute bronchitis J20.9    DM II (diabetes mellitus, type II), controlled ([facility] [address] E11.9    Osteoarthritis M19.90    Glaucoma H40.9    Unilateral congenital absence of kidney Q60.0    Hyperlipidemia, unspecified E78.5    Chronic GERD K21.9    Brain mass G93.89    S/P craniotomy Z98.890       Isolation/Infection:   Isolation          No Isolation Vent [facility]:[id number]}    Rehab Therapies: {THERAPEUTIC INTERVENTION:7371569290}  Weight Bearing Status/Restrictions: {[facility] Weight Bearin}  Other Medical Equipment (for information only, NOT a DME order):  {EQUIPMENT:099730373}  Other Treatments: ***    Patient's personal belongings (please select all that are sent with patient):  {[facility] DME Belongings:793335362}    RN SIGNATURE:  {Esignature:601454443}    CASE MANAGEMENT/SOCIAL WORK SECTION    Inpatient Status Date: 3/24/2021    Readmission Risk Assessment Score:  Readmission Risk              Risk of Unplanned Readmission:        13           / signature: Electronically signed by Otilia Colby RN on 3/26/21 at 9:23 AM EDT    PHYSICIAN SECTION    Prognosis: {Prognosis:7505020912}    Condition at Discharge: [address] [place] [address] Patient Condition:709304830}    Rehab Potential (if transferring to Rehab): {Prognosis:6208286466}    Recommended Labs or Other Treatments After Discharge: ***    Physician Certification: I certify the above information and transfer of Carolynn Nath  is necessary for the continuing treatment of the diagnosis listed and that she requires {Admit to Appropriate Level of Care:37539} for {GREATER/LESS:268219845} 30 days.      Update Admission H&P: {[facility]P DME Changes in [facility]:734773728}    PHYSICIAN SIGNATURE:  {Esignature:080262123}

## 2021-03-26 NOTE — CARE COORDINATION
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]  Brain mass [G93.89]   Date / Time: 3/24/2021  9:50 AM    Current PCP: Wagner Aquino MD  Clinic patient: No    Hospitalization in the last 30 days: No    Advance Directives:  Code Status: Full Code  [place] DNR form completed and on chart: Not Indicated    Financial:  Payor: MEDICARE / Plan: MEDICARE PART A AND B / Product Type: *No Product type* /      Pharmacy:    [facility] [place] [phone] Efren Santos [phone]   [facility]ica V[facility] [place] [address]  Phone: [phone] Fax: [phone]      Assistance purchasing medications?:    Assistance provided by Case Management: None at this time    Does patient want to participate in local refill/ meds to beds program?:      Meds To Beds General Rules:  1. Can ONLY be done Monday- Friday between 8:30am-5pm  2. Prescription(s) must be in pharmacy by 3pm to be filled same day  3. Copy of patient's insurance/ prescription drug card and patient face sheet must be sent along with the prescription(s)  4. Cost of Rx cannot be added to hospital bill. If financial assistance is needed, please contact unit  or ;  or  CANNOT provide pharmacy voucher for patients co-pays  5.  Patients can then  the prescription on their way out of the hospital at discharge, or pharmacy can deliver to the bedside if staff is available. (payment due at time of pick-up or delivery - cash, check, or card accepted)     Able to afford home medications/ co-pay costs: Yes    ADLS:  Current PT AM-PAC Score: 18 /24  Current OT AM-PAC Score: 18 /24      DISCHARGE Disposition: Home- No Services Needed    LOC at discharge: Not Applicable  DOMINIQUE Completed: Yes    Notification completed in HENS/PAS?:  Not Applicable    IMM Completed:   Not Indicated    Transportation:  Transportation PLAN for discharge: family   Mode of Transport: Private Car  Reason for medical transport: Not Applicable  Name of [facility]: Not Applicable  Time of Transport: when ready    Transport form completed: Not Indicated    Home Care:  [address] ordered at discharge: Not [address]: Not Applicable  Orders faxed: No    Durable Medical Equipment:  DME Provider:   Equipment obtained during hospitalization:     Home Oxygen and Respiratory Equipment:  Oxygen needed at discharge?: Not [address]: Not Applicable  Portable tank available for discharge?: Not Indicated    Dialysis:  Dialysis patient: No    Dialysis Center:  Not Applicable    Hospice Services:  Location: Not Applicable  Agency: Not Applicable    Consents signed: Not Indicated    Referrals made at [facility] for outpatient continued care:  Not Applicable    Additional CM Notes:   Patient here from home with family. Plans for discharge to home with daughter transporting. Denied any needs from CM.  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  Yes    Care Transitions patient: No    Lyubov Rincon, RN  [facility]  Case Management Department  Ph: [phone]  Fax: [phone]

## 2021-03-26 NOTE — PROGRESS NOTES
Hospitalist Progress Note      PCP: Nany Gomez MD    Date of Admission: 3/24/2021    CC brain mass    Hospital course  Patient is 70-year-old female with a history of diabetes mellitus type 2 admitted for left parieto-occipital mass tumor resection. S/p left parieto-occipital craniotomy for resection of tumor on 3/24    Subjective  Reports feeling well this am.  Being discharged by neurosurg. No complaints.     Medications:  Reviewed    Infusion Medications    dextrose       Scheduled Medications    amLODIPine  5 mg Oral Daily    heparin (porcine)  5,000 Units Subcutaneous 3 times per day    ferrous sulfate  325 mg Oral BID    latanoprost  1 drop Both Eyes Nightly    levETIRAcetam  500 mg Oral BID    pantoprazole  40 mg Oral QAM AC    atorvastatin  10 mg Oral Nightly    sodium chloride flush  10 mL Intravenous 2 times per day    ceFAZolin (ANCEF) IVPB  2,000 mg Intravenous Q8H    polyethylene glycol  17 g Oral Daily    sennosides-docusate sodium  1 tablet Oral BID    dexamethasone  4 mg Oral 3 times per day    Followed by    dexamethasone  4 mg Oral 2 times per day    Followed by   Tessie Felty ON 3/28/2021] dexamethasone  4 mg Oral Daily    insulin lispro  0-6 Units Subcutaneous TID WC    insulin lispro  0-3 Units Subcutaneous Nightly     PRN Meds: sodium chloride flush, acetaminophen, oxyCODONE **OR** oxyCODONE, HYDROmorphone **OR** HYDROmorphone, naloxone, promethazine **OR** ondansetron, aluminum & magnesium hydroxide-simethicone, bisacodyl, labetalol, glucose, dextrose, glucagon (rDNA), dextrose, potassium chloride, magnesium sulfate      Intake/Output Summary (Last 24 hours) at 3/26/2021 1302  Last data filed at 3/26/2021 0915  Gross per 24 hour   Intake 700 ml   Output --   Net 700 ml       Physical Exam Performed:    /64   Pulse 72   Temp 97.9 °F (36.6 °C) (Oral)   Resp 16   Ht 5' 2\" (1.575 m)   Wt 234 lb 12.8 oz (106.5 kg)   SpO2 96%   BMI 42.95 kg/m²     General not in apparent distress  Respiratory: no resp distress  Neuro: alert oriented x3, no focal neuro deficit noted  Skin: no rash noted  Psych: alert, cognition intact    Radiology:  MRI brain with and without contrast   Final Result      1. Status post left parieto-occipital mass resection. Resection cavity containing fluid, pneumocephalus and mild expected postsurgical hemorrhage. 2.  Mild enhancement around the resection cavity may also be postsurgical but is difficult to differentiate from residual tumor. 3.  Resolution of midline shift. 4.  Otherwise stable appearance of remaining other intracranial metastatic lesions. CT Head wo Contrast   Final Result      Postsurgical changes of left parieto-occipital craniotomy without evidence of acute abnormality. Assessment/Plan:    Active Hospital Problems    Diagnosis Date Noted    S/P craniotomy [Z98.890] 03/26/2021    Brain mass [G93.89] 03/24/2021     #Brain mass status post left parieto-occipital craniotomy for tumor resection on 3/24  On Keppra for seizure prophylaxis, Decadron 4 inflammation. Oxycodone for pain control. Seizure prophylaxis sign neurosurgery on board. #Hypertension  SBP less than 140  Patient was requiring as needed labetalol therefore amlodipine 5 mg daily added and bp now stable<140. Cont on dc. #Diabetes mellitus type 2  On Metformin at home- held. Placed on insulin sliding scale in house. Restart metformin on dc. DVT Prophylaxis: SCD  Diet: DIET GENERAL;  Code Status: Full Code    Dispo -ok for dc from medical standpoint when ready per n/s.       Susan Garsia MD

## 2021-03-26 NOTE — PLAN OF CARE
Problem: Falls - Risk of:  Goal: Will remain free from falls  Description: Will remain free from falls  3/26/2021 0030 by Vivaine French RN  Outcome: Ongoing  Note: Pt will remain free of falls for the duration of the shift. Pt is A/O x4  and uses the call light appropriately for needs. Pt is SBA with RW and GB. Pt has a steady gait. Bed alarm on, wheels locked, bed in lowest position, side rails up 2/4, nonskid socks on, call light and bedside table in reach. Will continue to monitor.

## 2021-03-26 NOTE — PROGRESS NOTES
pertinent to this visit. has a past medical history of Arthritis, Back pain, Blurred vision, Chronic GERD, Congenital absence of one kidney, Diabetes mellitus ([address] Difficulty speaking, Glaucoma, Hyperlipidemia, unspecified, Hypertension, Memory loss, and Prolonged emergence from general anesthesia. has a past surgical history that includes Hysterectomy; Cholecystectomy; eye surgery; knee surgery; cyst removal; Colonoscopy; and craniotomy (Left, 3/24/2021). Restrictions  Restrictions/Precautions  Restrictions/Precautions: Seizure  Position Activity Restriction  Other position/activity restrictions: up as tolerated, ambulate pt  Subjective   General  Chart Reviewed: Yes  Additional Pertinent Hx: LEFT PARIETO-OCCIPITAL CRANIOTOMY FOR TUMOR RESECTION 3/24. Family / Caregiver Present: Yes(dtr)  Subjective  Subjective: Pt found sitting in chair. Agreeable to PT. Dtr present.   Pt plans to d/c home this PM.  General Comment  Comments: /84 prior to mobility  Pain Screening  Patient Currently in Pain: Denies       Orientation  Orientation  Overall Orientation Status: (oriented to self and situation)  Cognition      Objective      Transfers  Sit to Stand: Stand by assistance  Stand to sit: Stand by assistance  Ambulation  Ambulation?: Yes  Ambulation 1  Device: Rolling Walker  Assistance: Stand by assistance  Quality of Gait: steady gait, no LOB, decreased vance  Distance: 150 ft  Stairs/Curb  Stairs?: Yes(negotiated 2 steps with handrail and CGA; pt declines further trials - states she feels confident in stair negotiation)     Balance  Sitting - Static: Good  Sitting - Dynamic: Good  Standing - Static: Good  Standing - Dynamic: Fair                           G-Code     OutComes Score                                                     AM-PAC Score  AM-PAC Inpatient Mobility Raw Score : 18 (03/26/21 1158)  AM-PAC Inpatient T-Scale Score : 43.63 (03/26/21 1158)  Mobility Inpatient CMS 0-100% Score: 46.58 (03/26/21 1158)  Mobility Inpatient CMS G-Code Modifier : CK (03/26/21 1158)          Goals  Short term goals  Time Frame for Short term goals: dc  Short term goal 1: Bed Mobility sBA  ongoing  Short term goal 2: Transfers SBA  MET 3/26  Short term goal 3: ambulate 100' with RW SBA  MET 3/26  New goal: pt will amb 150 ft with RW and supervision  Short term goal 4: up/down flight of stairs with rail CGA  PARTIALLY MET 3/26  Patient Goals   Patient goals : home tomorrow with daughter    Plan    Plan  Times per week: 5-7  Current Treatment Recommendations: Balance Training, Functional Mobility Training, Transfer Training, Gait Training, Stair training, Safety Education & Training  Safety Devices  Type of devices: Call light within reach, Chair alarm in place, Nurse notified, Left in chair, Gait belt     Therapy Time   Individual Concurrent Group Co-treatment   Time In 1132         Time Out 1155         Minutes 23                 Timed Code Treatment Minutes:  23    Total Treatment Minutes:  23    If patient is discharged prior to next treatment, this note will serve as the discharge summary.   Heidi Castorena, PT, DPT  [id number]

## 2021-03-26 NOTE — DISCHARGE SUMMARY
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 female patient who was admitted to the hospital for confusion, aphsia, and memory impairment. On workup, she was found to have a large left parieto-occipital mass with extensive surrounding vasogenic edema as well as a lung mass and two other right-sided smaller ring-enhancing lesions. She underwent the procedure listed above on 3/24/2021. Hospital Course:  After surgery, Her pre-operative confusion improved. She complained of incisional pain. The pain was well-controlled on oral medications. The incision was clean, dry and intact. There was no erythema or edema around the surgical site. Prior to discharge She was eating well, urinating and ambulating with a steady gait.     Discharge Vitals/Labs:  BP (!) 154/80   Pulse 72   Temp 97.9 °F (36.6 °C) (Oral)   Resp 16   Ht 5' 2\" (1.575 m)   Wt 230 lb 9.6 oz (104.6 kg)   SpO2 96%   BMI 42.18 kg/m²   CBC:   Lab Results   Component Value Date    WBC 13.1 03/25/2021    RBC 5.03 03/25/2021    HGB 16.0 03/25/2021    HCT 48.0 03/25/2021    MCV 95.4 03/25/2021    MCH 31.7 03/25/2021    MCHC 33.2 03/25/2021    RDW 13.8 03/25/2021     03/25/2021    MPV 10.1 03/25/2021     CMP:    Lab Results   Component Value Date     03/25/2021    K 3.7 03/25/2021     03/25/2021    CO2 20 03/25/2021    BUN 5 03/25/2021    CREATININE 0.7 03/25/2021    GFRAA >60 03/25/2021    GFRAA >60 04/04/2013    AGRATIO 1.2 06/09/2018    LABGLOM >60 03/25/2021    GLUCOSE 152 03/25/2021    PROT 7.1 06/09/2018    PROT 7.1 04/13/2012    LABALBU

## 2021-03-26 NOTE — PROGRESS NOTES
NEUROSURGERY POST-OP PROGRESS NOTE    Patient Name: Haylie Awad YOB: 1950   Sex: Female Age: 79 yrs     Medical Record Number: [id number] Severiano Number: [de-identified]   Room Number: [address]  Day: Hospital Day: 3     Interval History:  Post-operative Day# 2 s/p Procedure(s) (LRB):  LEFT PARIETO-OCCIPITAL CRANIOTOMY FOR TUMOR RESECTION (Left)    Subjective: Pt has no c/o this am    Objective:    VITAL SIGNS   BP (!) 154/80   Pulse 72   Temp 97.9 °F (36.6 °C) (Oral)   Resp 16   Ht 5' 2\" (1.575 m)   Wt 230 lb 9.6 oz (104.6 kg)   SpO2 96%   BMI 42.18 kg/m²    Height Height: 5' 2\" (157.5 cm)   Weight Weight: 230 lb 9.6 oz (104.6 kg)        Allergies Allergies   Allergen Reactions    Ibuprofen      GI issues    Naprosyn [Naproxen]     Codeine Nausea And Vomiting and Other (See Comments)     ABDOMINAL CRAMPING      NPO Status DIET GENERAL;   Isolation No active isolations     LABS   Basic Metabolic Profile Recent Labs     03/24/21  1418 03/24/21  2355 03/25/21  0915    141 138    109 105   CO2 22 23 20*   BUN 6* 5* 5*   CREATININE 0.5* 0.7 0.7   GLUCOSE 124* 145* 152*   MG 1.30* 1.90  --       Complete Blood Count Recent Labs     03/25/21  0915   WBC 13.1*   RBC 5.03      Coagulation Studies Recent Labs     03/24/21  1045   INR 1.31*        MEDICATIONS   Inpatient Medications     amLODIPine, 5 mg, Oral, Daily    heparin (porcine), 5,000 Units, Subcutaneous, 3 times per day    ferrous sulfate, 325 mg, Oral, BID    latanoprost, 1 drop, Both Eyes, Nightly    levETIRAcetam, 500 mg, Oral, BID    pantoprazole, 40 mg, Oral, QAM AC    atorvastatin, 10 mg, Oral, Nightly    sodium chloride flush, 10 mL, Intravenous, 2 times per day    ceFAZolin (ANCEF) IVPB, 2,000 mg, Intravenous, Q8H    polyethylene glycol, 17 g, Oral, Daily    sennosides-docusate sodium, 1 tablet, Oral, BID    [COMPLETED] dexamethasone, 4 mg, Oral, 4 times per day **FOLLOWED BY** dexamethasone, 4 mg, Oral, 3 times per day **FOLLOWED BY** dexamethasone, 4 mg, Oral, 2 times per day **FOLLOWED BY** [START ON 3/28/2021] dexamethasone, 4 mg, Oral, Daily    insulin lispro, 0-6 Units, Subcutaneous, TID WC    insulin lispro, 0-3 Units, Subcutaneous, Nightly   Infusions    dextrose        Antibiotics   Recent Abx Admin                   ceFAZolin (ANCEF) 2000 mg in dextrose 3 % 50 mL IVPB (duplex) (mg) 2,000 mg New Bag 03/26/21 0433     2,000 mg New Bag 03/25/21 2046     2,000 mg New Bag  1558                 Neurologic Exam:  Mental status: awake and alert and oriented x4    Cranial Nerves:  II: Visual acuity not tested, visual fields full, denies new visual changes / diplopia  III, IV, VI: PERRL, 3 mm bilaterally, EOMI, no nystagmus noted  V: Facial sensation intact bilaterally to touch  VII: Face symmetric  VIII: Hearing intact bilaterally to spoken voice  IX: Palate movement equal bilaterally  XI: Shoulder shrug equal bilaterally  XII: Tongue midline      Musculoskeletal:   Gait: Not tested   Tone: normal  Sensory: intact to all extremities  Motor strength:    Right  Left    Right  Left    Deltoid  5 5  Hip Flex  5 5   Biceps  5 5  Knee Extensors  5 5   Triceps  5 5  Knee Flexors  5 5   Wrist Ext  5 5  Ankle Dorsiflex. 5 5   Wrist Flex  5 5  Ankle Plantarflex. 5 5   Handgrip  5 5  Ext Boo Longus  5 5   Thumb Ext  5 5         Incision: intact, clean and dry      Respiratory:  Unlabored respiratory pattern    Abdomen:   Soft, ND       Cardiovascular:  Warm, well perfused    Assessment   Patient is a 80 yo F s/p Procedure(s) (LRB):  LEFT PARIETO-OCCIPITAL CRANIOTOMY FOR TUMOR RESECTION (Left) per     Plan:  Patient improved significantly post-operatively. Eating, voiding and ambulating well.   Pain and nausea well controlled on oral medications  Surgical incision CDI without issues  Discharge home with family  All discharge instructions including dressing changes and follow up care gone over with patient and family and provided in written form    Patient was seen with Dr. Deanna Nails who agrees with above assessment and plan. Electronically signed by:  Chalino Mendoza, 3/26/2021 9:02 AM   Neurosurgery Nurse Practitioner  [phone]

## 2021-03-27 ENCOUNTER — CARE COORDINATION (OUTPATIENT)
Dept: CASE MANAGEMENT | Age: 71
End: 2021-03-27

## 2021-03-27 NOTE — CARE COORDINATION
[facility] Transitions Initial Follow Up Call    Call within 2 business days of discharge: Yes    Patient: Eliseo Taveras Patient : 1950   MRN: [id number]   Reason for Admission: COVID 19   Discharge Date: 3/26/21 RARS: Readmission Risk Score: 13      Last Discharge [facility]       Complaint Diagnosis Description Type Department Provider    3/24/21  S/P craniotomy . .. Admission (Discharged) [address] Rc Ng MD           Spoke with: [facility] [address]: [facility]      Non-face-to-face services provided:  Obtained and reviewed discharge summary and/or continuity of care documents  Education of patient/family/caregiver/guardian to support self-management-   Assessment and support for treatment adherence and medication management-      Care Transitions 24 Hour Call    Do you have any ongoing symptoms?: No  Do you have a copy of your discharge instructions?: Yes  Do you have all of your prescriptions and are they filled?: Yes  Have you been contacted by a [address]?: No  Have you scheduled your follow up appointment?: Yes  Were you discharged with any Home Care or Post Acute Services: No  Do you feel like you have everything you need to keep you well at home?: Yes  Care Transitions Interventions       Challenges to be reviewed by the provider   Additional needs identified to be addressed with provider:   NO    Discussed COVID-19 related testing which was available at this time     Test results were available     Patient informed of results, if available? Yes               Method of communication with provider :   phone    Was this a readmission? no    Care Transition Nurse (CTN) contacted the family by telephone to perform post hospital discharge assessment. Verified name with family as identifier. Provided introduction to self, and explanation of the CTN role.      CTN reviewed discharge instructions, medical action plan and red flags with family who verbalized understanding. Family given an opportunity to ask questions and does not have any further questions or concerns at this time. Were discharge instructions available to patient? Yes     Reviewed appropriate site of care based on symptoms and resources available to patient including:      PCP   Specialist   After hour contact number   Urgent [facility]   When to call 911     The patient agrees to contact the PCP office for questions related to their healthcare. Medication reconciliation was performed with family, who verbalizes understanding of administration of home medications. Advised obtaining a 90-day supply of all daily and as-needed medications. Covid Risk Education    Patient has following risk factors of:   DM   Immunocompromised    Education provided regarding infection prevention, and signs and symptoms of COVID-19 and when to seek medical attention with family who verbalized understanding. Discussed exposure protocols and quarantine From CDC: Are you at higher risk for severe illness?   and given an opportunity for questions and concerns. The family agrees to contact the COVID-19 hotline [phone] or PCP office for questions related to COVID-19. For more information on steps you can take to protect yourself, see CDC's How to Protect Yourself     Family given information for [facility] and agrees to enroll:  yes and declined     Discussed follow-up appointments. If no appointment was previously scheduled, appointment scheduling offered:  Pt has appt with surgeon on 4/5    Is follow up appointment scheduled within 7 days of discharge? NO     Non-[facility] follow up appointment(s): no    Plan for f/u call in 10-14 days based on severity of symptoms and risk factors. CTN provided contact information for future needs. SUMMARY  CTC spoke to the Pt''s daughter, Guru Corey, who denies any s/s r/t Covid 23. She states he mother is doing really good since returning home.    LBM was Wednesday and dtr confirms she is giving the Pt glycolax and Senokot-S as recommended. Dtr confirms she has all meds and Pt is taking as prescribed. Pt has appt with neurosurgeon on 4/5. Pt will take all meds as prescribed and schedule / keep doctors appt. CTC provided education on s/s that require medical attention and when to seek medical attention. CTC advised Pt of use urgent care or physicians 24 hr access line if assistance is needed after hours or on the weekend. Pt denies any needs or concerns and is agreeable with additional calls. Follow Up  No future appointments.     Joanne Rivera RN

## 2021-04-02 ENCOUNTER — CARE COORDINATION (OUTPATIENT)
Dept: CASE MANAGEMENT | Age: 71
End: 2021-04-02

## 2021-04-09 ENCOUNTER — CARE COORDINATION (OUTPATIENT)
Dept: CASE MANAGEMENT | Age: 71
End: 2021-04-09

## (undated) DEVICE — SOLUTION IV 500ML 0.9% SOD CHL PH 5 INJ USP VIAFLX PLAS

## (undated) DEVICE — TOOL 9BA60 LEGEND 9CM 6MM BA: Brand: MIDAS REX

## (undated) DEVICE — TOOL F2/8TA23 LEGEND 8CM 2.3MM TAPER: Brand: MIDAS REX™

## (undated) DEVICE — ADAPTER LAB INTMED LUER 17GA

## (undated) DEVICE — HOOK RETRCT 12MM S STL BLNT E STAY LONE STAR

## (undated) DEVICE — INTENDED USE FOR SURGICAL MARKING ON INTACT SKIN, ALSO PROVIDES A PERMANENT METHOD OF IDENTIFYING OBJECTS IN THE OPERATING ROOM: Brand: WRITESITE® PLUS MINI PREP RESISTANT MARKER

## (undated) DEVICE — COTTON BALLS, DOUBLE STRUNG: Brand: DEROYAL

## (undated) DEVICE — NEURO SPONGES: Brand: DEROYAL

## (undated) DEVICE — MARKER REFLECTIVE REFLECTIVE TWST ON SPHERZ 5PK

## (undated) DEVICE — CODMAN® SURGICAL PATTIES 1/4" X 1/4" (0.64CM X 0.64CM): Brand: CODMAN®

## (undated) DEVICE — SUTURE NRLN SZ 4-0 L18IN NONABSORBABLE BLK L13MM TF 1/2 CIR C584D

## (undated) DEVICE — UNDERGLOVE SURG SZ 8 BLU LTX FREE SYN POLYISOPRENE POLYMER

## (undated) DEVICE — CODMAN® SURGICAL PATTIES 1/2" X 1/2" (1.27CM X 1.27CM): Brand: CODMAN®

## (undated) DEVICE — BLADE CLIPPER SURG SENSICLIP

## (undated) DEVICE — DRESSING GERM DIA1IN CNTR H DIA7MM BLU CHG ANTIMIC PROTCT

## (undated) DEVICE — BANDAGE,GAUZE,BULKEE II,4.5"X4.1YD,STRL: Brand: MEDLINE

## (undated) DEVICE — TOWEL,OR,DSP,ST,WHITE,DLX,4/PK,20PK/CS: Brand: MEDLINE

## (undated) DEVICE — COUNTER NDL 40 COUNT HLD 70 NUM FOAM BLK SGL MAG W BLDE REMV

## (undated) DEVICE — JEWISH CRANI PACK: Brand: MEDLINE INDUSTRIES, INC.

## (undated) DEVICE — ANES EXTENSION SET 90IN-LF: Brand: MEDLINE INDUSTRIES, INC.

## (undated) DEVICE — SPONGE GZ W4XL4IN COT 12 PLY TYP VII WVN C FLD DSGN

## (undated) DEVICE — SURE SET-DOUBLE BASIN-LF: Brand: MEDLINE INDUSTRIES, INC.

## (undated) DEVICE — AGENT HEMSTAT W2XL14IN OXIDIZED REGENERATED CELOS ABSRB FOR

## (undated) DEVICE — CONTAINER,SPECIMEN,PNEU TUBE,3OZ,OR STRL: Brand: MEDLINE

## (undated) DEVICE — CATHETER IV 14GA L5.25IN PERIPH ORNG FEP POLYMER 3 BVL

## (undated) DEVICE — COVER LT HNDL CAM BLU DISP W/ SURG CTRL

## (undated) DEVICE — COVER MICSCP W46XL120IN 4 BINOC GLS LENS LEICA

## (undated) DEVICE — PAD,NON-ADHERENT,3X8,STERILE,LF,1/PK: Brand: MEDLINE

## (undated) DEVICE — STAPLER SKIN H3.9MM WIRE DIA0.58MM CRWN 6.9MM 35 STPL ROT

## (undated) DEVICE — PRESSURE MONITORING SET: Brand: TRUWAVE, VAMP PLUS

## (undated) DEVICE — JEWISH HOSPITAL TURNOVER KIT: Brand: MEDLINE INDUSTRIES, INC.

## (undated) DEVICE — 3M™ TEGADERM™ TRANSPARENT FILM DRESSING FRAME STYLE, 1624W, 2-3/8 IN X 2-3/4 IN (6 CM X 7 CM), 100/CT 4CT/CASE: Brand: 3M™ TEGADERM™

## (undated) DEVICE — SEALANT SURG 13 YR DURA AUTOSPRAY ADHERUS NUS106] SURGICAL ONE]

## (undated) DEVICE — SPONGE,LAP,18"X18",DLX,XR,ST,5/PK,40/PK: Brand: MEDLINE

## (undated) DEVICE — CODMAN® SURGICAL PATTIES 3/4" X 3/4" (1.91CM X 1.91CM): Brand: CODMAN®

## (undated) DEVICE — SSC BONE WAX: Brand: SSC BONE WAX

## (undated) DEVICE — GLOVE SURG SZ 6 THK91MIL LTX FREE SYN POLYISOPRENE ANTI

## (undated) DEVICE — PLATE ES AD W 9FT CRD 2

## (undated) DEVICE — GARMENT,MEDLINE,DVT,INT,CALF,MED, GEN2: Brand: MEDLINE

## (undated) DEVICE — SYRINGE MED 3ML CLR PLAS STD N CTRL LUERLOCK TIP DISP

## (undated) DEVICE — GLOVE SURG SZ 75 L12IN FNGR THK94MIL TRNSLUC YEL LTX

## (undated) DEVICE — MARKER SURG SKIN UTIL BLK REG TIP NONSMEARING W/ 6IN RUL

## (undated) DEVICE — PROTECTOR ULN NRV PUR FOAM HK LOOP STRP ANATOMICALLY

## (undated) DEVICE — APPLICATOR MEDICATED 26 CC SOLUTION HI LT ORNG CHLORAPREP

## (undated) DEVICE — SPONGE,NEURO,0.5"X3",XR,STRL,LF,10/PK: Brand: MEDLINE

## (undated) DEVICE — SUTURE VCRL SZ 2-0 L18IN ABSRB UD CT-1 L36MM 1/2 CIR J839D

## (undated) DEVICE — KIT CATHETER 20GA L12CM ART CUST

## (undated) DEVICE — TELFA 1" X 3": Brand: TELFA

## (undated) DEVICE — SOLUTION IV 1000ML 0.9% SOD CHL

## (undated) DEVICE — COVER,TABLE,HEAVY DUTY,77"X90",STRL: Brand: MEDLINE

## (undated) DEVICE — PIN ADLT MAYFIELD RIGID MOLD FINGER

## (undated) DEVICE — BLANKET WRM W40.2XL55.9IN IORT LO BODY + MISTRAL AIR

## (undated) DEVICE — 3L THIN WALL CAN: Brand: CRD

## (undated) DEVICE — SPONGE 300501 MEROCEL 100PK 1.3 X 1.3CM: Brand: MEROCEL®